# Patient Record
Sex: FEMALE | Race: WHITE | NOT HISPANIC OR LATINO | Employment: OTHER | URBAN - METROPOLITAN AREA
[De-identification: names, ages, dates, MRNs, and addresses within clinical notes are randomized per-mention and may not be internally consistent; named-entity substitution may affect disease eponyms.]

---

## 2018-01-02 ENCOUNTER — OFFICE VISIT CONVERTED (OUTPATIENT)
Dept: FAMILY MEDICINE CLINIC | Facility: CLINIC | Age: 63
End: 2018-01-02
Attending: NURSE PRACTITIONER

## 2019-01-07 ENCOUNTER — HOSPITAL ENCOUNTER (OUTPATIENT)
Dept: URGENT CARE | Facility: CLINIC | Age: 64
Discharge: HOME | End: 2019-01-07

## 2019-01-09 LAB — BACTERIA SPEC AEROBE CULT: NORMAL

## 2019-01-15 ENCOUNTER — OFFICE VISIT CONVERTED (OUTPATIENT)
Dept: FAMILY MEDICINE CLINIC | Facility: CLINIC | Age: 64
End: 2019-01-15
Attending: NURSE PRACTITIONER

## 2019-03-27 ENCOUNTER — HOSPITAL ENCOUNTER (OUTPATIENT)
Dept: FAMILY MEDICINE CLINIC | Facility: CLINIC | Age: 64
Discharge: HOME | End: 2019-03-27

## 2019-03-27 ENCOUNTER — OFFICE VISIT CONVERTED (OUTPATIENT)
Dept: FAMILY MEDICINE CLINIC | Facility: CLINIC | Age: 64
End: 2019-03-27
Attending: NURSE PRACTITIONER

## 2019-03-27 LAB
ALBUMIN SERPL-MCNC: 4 G/DL (ref 3.5–5)
ALBUMIN/GLOB SERPL: 1.1 {RATIO} (ref 1.4–2.6)
ALP SERPL-CCNC: 116 U/L (ref 43–160)
ALT SERPL-CCNC: 17 U/L (ref 10–40)
ANION GAP SERPL CALC-SCNC: 17 MMOL/L (ref 8–19)
AST SERPL-CCNC: 25 U/L (ref 15–50)
BASOPHILS # BLD AUTO: 0.04 10*3/UL (ref 0–0.2)
BASOPHILS NFR BLD AUTO: 0.4 % (ref 0–3)
BILIRUB SERPL-MCNC: 0.35 MG/DL (ref 0.2–1.3)
BUN SERPL-MCNC: 15 MG/DL (ref 5–25)
BUN/CREAT SERPL: 16 {RATIO} (ref 6–20)
CALCIUM SERPL-MCNC: 9.4 MG/DL (ref 8.7–10.4)
CHLORIDE SERPL-SCNC: 102 MMOL/L (ref 99–111)
CONV ABS IMM GRAN: 0.03 10*3/UL (ref 0–0.2)
CONV CO2: 27 MMOL/L (ref 22–32)
CONV IMMATURE GRAN: 0.3 % (ref 0–1.8)
CONV TOTAL PROTEIN: 7.7 G/DL (ref 6.3–8.2)
CREAT UR-MCNC: 0.91 MG/DL (ref 0.5–0.9)
DEPRECATED RDW RBC AUTO: 50.2 FL (ref 36.4–46.3)
EOSINOPHIL # BLD AUTO: 0.57 10*3/UL (ref 0–0.7)
EOSINOPHIL # BLD AUTO: 6.1 % (ref 0–7)
ERYTHROCYTE [DISTWIDTH] IN BLOOD BY AUTOMATED COUNT: 15.3 % (ref 11.7–14.4)
GFR SERPLBLD BASED ON 1.73 SQ M-ARVRAT: >60 ML/MIN/{1.73_M2}
GLOBULIN UR ELPH-MCNC: 3.7 G/DL (ref 2–3.5)
GLUCOSE SERPL-MCNC: 84 MG/DL (ref 65–99)
HBA1C MFR BLD: 13.5 G/DL (ref 12–16)
HCT VFR BLD AUTO: 44.9 % (ref 37–47)
LYMPHOCYTES # BLD AUTO: 3.11 10*3/UL (ref 1–5)
MCH RBC QN AUTO: 26.5 PG (ref 27–31)
MCHC RBC AUTO-ENTMCNC: 30.1 G/DL (ref 33–37)
MCV RBC AUTO: 88.2 FL (ref 81–99)
MONOCYTES # BLD AUTO: 0.55 10*3/UL (ref 0.2–1.2)
MONOCYTES NFR BLD AUTO: 5.9 % (ref 3–10)
NEUTROPHILS # BLD AUTO: 4.99 10*3/UL (ref 2–8)
NEUTROPHILS NFR BLD AUTO: 53.8 % (ref 30–85)
NRBC CBCN: 0 % (ref 0–0.7)
OSMOLALITY SERPL CALC.SUM OF ELEC: 292 MOSM/KG (ref 273–304)
PLATELET # BLD AUTO: 294 10*3/UL (ref 130–400)
PMV BLD AUTO: 11.2 FL (ref 9.4–12.3)
POTASSIUM SERPL-SCNC: 4.7 MMOL/L (ref 3.5–5.3)
RBC # BLD AUTO: 5.09 10*6/UL (ref 4.2–5.4)
SODIUM SERPL-SCNC: 141 MMOL/L (ref 135–147)
T4 FREE SERPL-MCNC: 1.2 NG/DL (ref 0.9–1.8)
TSH SERPL-ACNC: 1.68 M[IU]/L (ref 0.27–4.2)
VARIANT LYMPHS NFR BLD MANUAL: 33.5 % (ref 20–45)
WBC # BLD AUTO: 9.29 10*3/UL (ref 4.8–10.8)

## 2019-03-29 LAB — H PYLORI IGM SER-ACNC: <9 UNITS (ref 0–8.9)

## 2019-06-14 ENCOUNTER — OFFICE VISIT CONVERTED (OUTPATIENT)
Dept: FAMILY MEDICINE CLINIC | Facility: CLINIC | Age: 64
End: 2019-06-14
Attending: NURSE PRACTITIONER

## 2019-06-14 ENCOUNTER — CONVERSION ENCOUNTER (OUTPATIENT)
Dept: FAMILY MEDICINE CLINIC | Facility: CLINIC | Age: 64
End: 2019-06-14

## 2019-08-15 ENCOUNTER — OFFICE VISIT CONVERTED (OUTPATIENT)
Dept: FAMILY MEDICINE CLINIC | Facility: CLINIC | Age: 64
End: 2019-08-15
Attending: NURSE PRACTITIONER

## 2019-08-15 ENCOUNTER — CONVERSION ENCOUNTER (OUTPATIENT)
Dept: FAMILY MEDICINE CLINIC | Facility: CLINIC | Age: 64
End: 2019-08-15

## 2019-09-16 ENCOUNTER — OFFICE VISIT CONVERTED (OUTPATIENT)
Dept: FAMILY MEDICINE CLINIC | Facility: CLINIC | Age: 64
End: 2019-09-16
Attending: NURSE PRACTITIONER

## 2019-09-16 ENCOUNTER — CONVERSION ENCOUNTER (OUTPATIENT)
Dept: FAMILY MEDICINE CLINIC | Facility: CLINIC | Age: 64
End: 2019-09-16

## 2019-11-06 ENCOUNTER — HOSPITAL ENCOUNTER (OUTPATIENT)
Dept: URGENT CARE | Facility: CLINIC | Age: 64
Discharge: HOME | End: 2019-11-06
Attending: EMERGENCY MEDICINE

## 2019-12-01 ENCOUNTER — HOSPITAL ENCOUNTER (OUTPATIENT)
Dept: URGENT CARE | Facility: CLINIC | Age: 64
Discharge: HOME | End: 2019-12-01

## 2019-12-27 ENCOUNTER — HOSPITAL ENCOUNTER (OUTPATIENT)
Dept: URGENT CARE | Facility: CLINIC | Age: 64
Discharge: HOME | End: 2019-12-27
Attending: FAMILY MEDICINE

## 2019-12-27 LAB
ALBUMIN SERPL-MCNC: 3.7 G/DL (ref 3.5–5)
ALBUMIN/GLOB SERPL: 1.2 {RATIO} (ref 1.4–2.6)
ALP SERPL-CCNC: 93 U/L (ref 43–160)
ALT SERPL-CCNC: 21 U/L (ref 10–40)
ANION GAP SERPL CALC-SCNC: 17 MMOL/L (ref 8–19)
AST SERPL-CCNC: 17 U/L (ref 15–50)
BILIRUB SERPL-MCNC: 0.32 MG/DL (ref 0.2–1.3)
BNP SERPL-MCNC: 88 PG/ML (ref 0–900)
BUN SERPL-MCNC: 23 MG/DL (ref 5–25)
BUN/CREAT SERPL: 22 {RATIO} (ref 6–20)
CALCIUM SERPL-MCNC: 9.7 MG/DL (ref 8.7–10.4)
CHLORIDE SERPL-SCNC: 99 MMOL/L (ref 99–111)
CONV CO2: 26 MMOL/L (ref 22–32)
CONV TOTAL PROTEIN: 6.8 G/DL (ref 6.3–8.2)
CREAT UR-MCNC: 1.06 MG/DL (ref 0.5–0.9)
GFR SERPLBLD BASED ON 1.73 SQ M-ARVRAT: 56 ML/MIN/{1.73_M2}
GLOBULIN UR ELPH-MCNC: 3.1 G/DL (ref 2–3.5)
GLUCOSE SERPL-MCNC: 152 MG/DL (ref 65–99)
OSMOLALITY SERPL CALC.SUM OF ELEC: 291 MOSM/KG (ref 273–304)
POTASSIUM SERPL-SCNC: 4.8 MMOL/L (ref 3.5–5.3)
SODIUM SERPL-SCNC: 137 MMOL/L (ref 135–147)
TSH SERPL-ACNC: 0.53 M[IU]/L (ref 0.27–4.2)

## 2020-01-02 ENCOUNTER — CONVERSION ENCOUNTER (OUTPATIENT)
Dept: FAMILY MEDICINE CLINIC | Facility: CLINIC | Age: 65
End: 2020-01-02

## 2020-01-02 ENCOUNTER — OFFICE VISIT CONVERTED (OUTPATIENT)
Dept: FAMILY MEDICINE CLINIC | Facility: CLINIC | Age: 65
End: 2020-01-02
Attending: NURSE PRACTITIONER

## 2020-01-03 ENCOUNTER — HOSPITAL ENCOUNTER (OUTPATIENT)
Dept: CARDIOLOGY | Facility: HOSPITAL | Age: 65
Discharge: HOME | End: 2020-01-03
Attending: NURSE PRACTITIONER

## 2020-01-08 ENCOUNTER — HOSPITAL ENCOUNTER (OUTPATIENT)
Dept: OTHER | Facility: HOSPITAL | Age: 65
Discharge: HOME | End: 2020-01-08

## 2020-01-08 LAB
ALBUMIN SERPL-MCNC: 3.8 G/DL (ref 3.5–5)
ALBUMIN/GLOB SERPL: 1.2 {RATIO} (ref 1.4–2.6)
ALP SERPL-CCNC: 95 U/L (ref 43–160)
ALT SERPL-CCNC: 20 U/L (ref 10–40)
ANION GAP SERPL CALC-SCNC: 16 MMOL/L (ref 8–19)
AST SERPL-CCNC: 16 U/L (ref 15–50)
BASOPHILS # BLD AUTO: 0.05 10*3/UL (ref 0–0.2)
BASOPHILS NFR BLD AUTO: 0.4 % (ref 0–3)
BILIRUB SERPL-MCNC: 0.3 MG/DL (ref 0.2–1.3)
BUN SERPL-MCNC: 17 MG/DL (ref 5–25)
BUN/CREAT SERPL: 17 {RATIO} (ref 6–20)
CALCIUM SERPL-MCNC: 9 MG/DL (ref 8.7–10.4)
CHLORIDE SERPL-SCNC: 100 MMOL/L (ref 99–111)
CONV ABS IMM GRAN: 0.14 10*3/UL (ref 0–0.2)
CONV CO2: 28 MMOL/L (ref 22–32)
CONV IMMATURE GRAN: 1.1 % (ref 0–1.8)
CONV TOTAL PROTEIN: 6.9 G/DL (ref 6.3–8.2)
CREAT UR-MCNC: 1.02 MG/DL (ref 0.5–0.9)
DEPRECATED RDW RBC AUTO: 53.1 FL (ref 36.4–46.3)
EOSINOPHIL # BLD AUTO: 0.14 10*3/UL (ref 0–0.7)
EOSINOPHIL # BLD AUTO: 1.1 % (ref 0–7)
ERYTHROCYTE [DISTWIDTH] IN BLOOD BY AUTOMATED COUNT: 15.8 % (ref 11.7–14.4)
ERYTHROCYTE [SEDIMENTATION RATE] IN BLOOD: 5 MM/H (ref 0–30)
GFR SERPLBLD BASED ON 1.73 SQ M-ARVRAT: 59 ML/MIN/{1.73_M2}
GLOBULIN UR ELPH-MCNC: 3.1 G/DL (ref 2–3.5)
GLUCOSE SERPL-MCNC: 109 MG/DL (ref 65–99)
HCT VFR BLD AUTO: 45.5 % (ref 37–47)
HGB BLD-MCNC: 13.9 G/DL (ref 12–16)
LYMPHOCYTES # BLD AUTO: 2.66 10*3/UL (ref 1–5)
LYMPHOCYTES NFR BLD AUTO: 20.1 % (ref 20–45)
MCH RBC QN AUTO: 28 PG (ref 27–31)
MCHC RBC AUTO-ENTMCNC: 30.5 G/DL (ref 33–37)
MCV RBC AUTO: 91.5 FL (ref 81–99)
MONOCYTES # BLD AUTO: 0.36 10*3/UL (ref 0.2–1.2)
MONOCYTES NFR BLD AUTO: 2.7 % (ref 3–10)
NEUTROPHILS # BLD AUTO: 9.87 10*3/UL (ref 2–8)
NEUTROPHILS NFR BLD AUTO: 74.6 % (ref 30–85)
NRBC CBCN: 0 % (ref 0–0.7)
OSMOLALITY SERPL CALC.SUM OF ELEC: 292 MOSM/KG (ref 273–304)
PLATELET # BLD AUTO: 250 10*3/UL (ref 130–400)
PMV BLD AUTO: 10 FL (ref 9.4–12.3)
POTASSIUM SERPL-SCNC: 4.3 MMOL/L (ref 3.5–5.3)
RBC # BLD AUTO: 4.97 10*6/UL (ref 4.2–5.4)
SODIUM SERPL-SCNC: 140 MMOL/L (ref 135–147)
WBC # BLD AUTO: 13.22 10*3/UL (ref 4.8–10.8)

## 2020-01-09 LAB
DSDNA AB SER-ACNC: NEGATIVE [IU]/ML
ENA AB SER IA-ACNC: NEGATIVE {RATIO}

## 2020-01-14 LAB
TPMT ACTIVITY: 26.1 UNITS/ML RBC
TPMT INTERPRETATION: NORMAL
TPMT METHODOLOGY: NORMAL

## 2020-02-27 ENCOUNTER — HOSPITAL ENCOUNTER (OUTPATIENT)
Dept: OTHER | Facility: HOSPITAL | Age: 65
Discharge: HOME | End: 2020-02-27

## 2020-02-27 LAB
ALBUMIN SERPL-MCNC: 3.5 G/DL (ref 3.5–5)
ALBUMIN/GLOB SERPL: 1.2 {RATIO} (ref 1.4–2.6)
ALP SERPL-CCNC: 100 U/L (ref 43–160)
ALT SERPL-CCNC: 23 U/L (ref 10–40)
ANION GAP SERPL CALC-SCNC: 18 MMOL/L (ref 8–19)
AST SERPL-CCNC: 15 U/L (ref 15–50)
BASOPHILS # BLD AUTO: 0.06 10*3/UL (ref 0–0.2)
BASOPHILS NFR BLD AUTO: 0.6 % (ref 0–3)
BILIRUB SERPL-MCNC: 0.36 MG/DL (ref 0.2–1.3)
BUN SERPL-MCNC: 16 MG/DL (ref 5–25)
BUN/CREAT SERPL: 15 {RATIO} (ref 6–20)
CALCIUM SERPL-MCNC: 10.1 MG/DL (ref 8.7–10.4)
CHLORIDE SERPL-SCNC: 98 MMOL/L (ref 99–111)
CONV ABS IMM GRAN: 0.05 10*3/UL (ref 0–0.2)
CONV CO2: 28 MMOL/L (ref 22–32)
CONV IMMATURE GRAN: 0.5 % (ref 0–1.8)
CONV TOTAL PROTEIN: 6.5 G/DL (ref 6.3–8.2)
CREAT UR-MCNC: 1.06 MG/DL (ref 0.5–0.9)
DEPRECATED RDW RBC AUTO: 49.2 FL (ref 36.4–46.3)
EOSINOPHIL # BLD AUTO: 0.34 10*3/UL (ref 0–0.7)
EOSINOPHIL # BLD AUTO: 3.3 % (ref 0–7)
ERYTHROCYTE [DISTWIDTH] IN BLOOD BY AUTOMATED COUNT: 14.7 % (ref 11.7–14.4)
GFR SERPLBLD BASED ON 1.73 SQ M-ARVRAT: 56 ML/MIN/{1.73_M2}
GLOBULIN UR ELPH-MCNC: 3 G/DL (ref 2–3.5)
GLUCOSE SERPL-MCNC: 107 MG/DL (ref 65–99)
HCT VFR BLD AUTO: 43.7 % (ref 37–47)
HGB BLD-MCNC: 13.3 G/DL (ref 12–16)
LYMPHOCYTES # BLD AUTO: 3.63 10*3/UL (ref 1–5)
LYMPHOCYTES NFR BLD AUTO: 35 % (ref 20–45)
MCH RBC QN AUTO: 27.7 PG (ref 27–31)
MCHC RBC AUTO-ENTMCNC: 30.4 G/DL (ref 33–37)
MCV RBC AUTO: 91 FL (ref 81–99)
MONOCYTES # BLD AUTO: 0.78 10*3/UL (ref 0.2–1.2)
MONOCYTES NFR BLD AUTO: 7.5 % (ref 3–10)
NEUTROPHILS # BLD AUTO: 5.5 10*3/UL (ref 2–8)
NEUTROPHILS NFR BLD AUTO: 53.1 % (ref 30–85)
NRBC CBCN: 0 % (ref 0–0.7)
OSMOLALITY SERPL CALC.SUM OF ELEC: 292 MOSM/KG (ref 273–304)
PLATELET # BLD AUTO: 260 10*3/UL (ref 130–400)
PMV BLD AUTO: 9.6 FL (ref 9.4–12.3)
POTASSIUM SERPL-SCNC: 3.8 MMOL/L (ref 3.5–5.3)
RBC # BLD AUTO: 4.8 10*6/UL (ref 4.2–5.4)
SODIUM SERPL-SCNC: 140 MMOL/L (ref 135–147)
WBC # BLD AUTO: 10.36 10*3/UL (ref 4.8–10.8)

## 2020-03-13 ENCOUNTER — CONVERSION ENCOUNTER (OUTPATIENT)
Dept: FAMILY MEDICINE CLINIC | Facility: CLINIC | Age: 65
End: 2020-03-13

## 2020-03-13 ENCOUNTER — HOSPITAL ENCOUNTER (OUTPATIENT)
Dept: FAMILY MEDICINE CLINIC | Facility: CLINIC | Age: 65
Discharge: HOME | End: 2020-03-13
Attending: NURSE PRACTITIONER

## 2020-03-13 ENCOUNTER — OFFICE VISIT CONVERTED (OUTPATIENT)
Dept: FAMILY MEDICINE CLINIC | Facility: CLINIC | Age: 65
End: 2020-03-13
Attending: NURSE PRACTITIONER

## 2020-03-13 LAB
25(OH)D3 SERPL-MCNC: 19.2 NG/ML (ref 30–100)
ALBUMIN SERPL-MCNC: 3.6 G/DL (ref 3.5–5)
ALBUMIN/GLOB SERPL: 1.1 {RATIO} (ref 1.4–2.6)
ALP SERPL-CCNC: 104 U/L (ref 43–160)
ALT SERPL-CCNC: 20 U/L (ref 10–40)
ANION GAP SERPL CALC-SCNC: 15 MMOL/L (ref 8–19)
AST SERPL-CCNC: 20 U/L (ref 15–50)
BASOPHILS # BLD AUTO: 0.04 10*3/UL (ref 0–0.2)
BASOPHILS NFR BLD AUTO: 0.3 % (ref 0–3)
BILIRUB SERPL-MCNC: 0.35 MG/DL (ref 0.2–1.3)
BUN SERPL-MCNC: 14 MG/DL (ref 5–25)
BUN/CREAT SERPL: 14 {RATIO} (ref 6–20)
CALCIUM SERPL-MCNC: 10.1 MG/DL (ref 8.7–10.4)
CHLORIDE SERPL-SCNC: 101 MMOL/L (ref 99–111)
CHOLEST SERPL-MCNC: 199 MG/DL (ref 107–200)
CHOLEST/HDLC SERPL: 5.4 {RATIO} (ref 3–6)
CONV ABS IMM GRAN: 0.07 10*3/UL (ref 0–0.2)
CONV CO2: 28 MMOL/L (ref 22–32)
CONV IMMATURE GRAN: 0.6 % (ref 0–1.8)
CONV TOTAL PROTEIN: 6.8 G/DL (ref 6.3–8.2)
CREAT UR-MCNC: 1 MG/DL (ref 0.5–0.9)
DEPRECATED RDW RBC AUTO: 50.3 FL (ref 36.4–46.3)
EOSINOPHIL # BLD AUTO: 0.54 10*3/UL (ref 0–0.7)
EOSINOPHIL # BLD AUTO: 4.6 % (ref 0–7)
ERYTHROCYTE [DISTWIDTH] IN BLOOD BY AUTOMATED COUNT: 14.7 % (ref 11.7–14.4)
FERRITIN SERPL-MCNC: 113 NG/ML (ref 10–200)
FOLATE SERPL-MCNC: 10.3 NG/ML (ref 4.8–20)
GFR SERPLBLD BASED ON 1.73 SQ M-ARVRAT: >60 ML/MIN/{1.73_M2}
GLOBULIN UR ELPH-MCNC: 3.2 G/DL (ref 2–3.5)
GLUCOSE SERPL-MCNC: 114 MG/DL (ref 65–99)
HCT VFR BLD AUTO: 43.8 % (ref 37–47)
HDLC SERPL-MCNC: 37 MG/DL (ref 40–60)
HGB BLD-MCNC: 13.1 G/DL (ref 12–16)
IRON SATN MFR SERPL: 15 % (ref 20–55)
IRON SERPL-MCNC: 50 UG/DL (ref 60–170)
LDLC SERPL CALC-MCNC: 121 MG/DL (ref 70–100)
LYMPHOCYTES # BLD AUTO: 2.43 10*3/UL (ref 1–5)
LYMPHOCYTES NFR BLD AUTO: 20.9 % (ref 20–45)
MCH RBC QN AUTO: 27.7 PG (ref 27–31)
MCHC RBC AUTO-ENTMCNC: 29.9 G/DL (ref 33–37)
MCV RBC AUTO: 92.6 FL (ref 81–99)
MONOCYTES # BLD AUTO: 0.78 10*3/UL (ref 0.2–1.2)
MONOCYTES NFR BLD AUTO: 6.7 % (ref 3–10)
NEUTROPHILS # BLD AUTO: 7.78 10*3/UL (ref 2–8)
NEUTROPHILS NFR BLD AUTO: 66.9 % (ref 30–85)
NRBC CBCN: 0 % (ref 0–0.7)
OSMOLALITY SERPL CALC.SUM OF ELEC: 291 MOSM/KG (ref 273–304)
PLATELET # BLD AUTO: 322 10*3/UL (ref 130–400)
PMV BLD AUTO: 10.2 FL (ref 9.4–12.3)
POTASSIUM SERPL-SCNC: 4.4 MMOL/L (ref 3.5–5.3)
RBC # BLD AUTO: 4.73 10*6/UL (ref 4.2–5.4)
SODIUM SERPL-SCNC: 140 MMOL/L (ref 135–147)
TIBC SERPL-MCNC: 333 UG/DL (ref 245–450)
TRANSFERRIN SERPL-MCNC: 233 MG/DL (ref 250–380)
TRIGL SERPL-MCNC: 207 MG/DL (ref 40–150)
TSH SERPL-ACNC: 1.95 M[IU]/L (ref 0.27–4.2)
VIT B12 SERPL-MCNC: 432 PG/ML (ref 211–911)
VLDLC SERPL-MCNC: 41 MG/DL (ref 5–37)
WBC # BLD AUTO: 11.64 10*3/UL (ref 4.8–10.8)

## 2020-03-19 ENCOUNTER — HOSPITAL ENCOUNTER (OUTPATIENT)
Dept: CARDIOLOGY | Facility: HOSPITAL | Age: 65
Discharge: HOME | End: 2020-03-19
Attending: NURSE PRACTITIONER

## 2020-05-04 ENCOUNTER — HOSPITAL ENCOUNTER (OUTPATIENT)
Dept: OTHER | Facility: HOSPITAL | Age: 65
Discharge: HOME | End: 2020-05-04

## 2020-05-04 LAB
ALBUMIN SERPL-MCNC: 3.5 G/DL (ref 3.5–5)
ALBUMIN/GLOB SERPL: 1.2 {RATIO} (ref 1.4–2.6)
ALP SERPL-CCNC: 93 U/L (ref 43–160)
ALT SERPL-CCNC: 13 U/L (ref 10–40)
ANION GAP SERPL CALC-SCNC: 22 MMOL/L (ref 8–19)
AST SERPL-CCNC: 16 U/L (ref 15–50)
BASOPHILS # BLD AUTO: 0.04 10*3/UL (ref 0–0.2)
BASOPHILS NFR BLD AUTO: 0.5 % (ref 0–3)
BILIRUB SERPL-MCNC: 0.38 MG/DL (ref 0.2–1.3)
BUN SERPL-MCNC: 11 MG/DL (ref 5–25)
BUN/CREAT SERPL: 10 {RATIO} (ref 6–20)
CALCIUM SERPL-MCNC: 9.7 MG/DL (ref 8.7–10.4)
CHLORIDE SERPL-SCNC: 104 MMOL/L (ref 99–111)
CONV ABS IMM GRAN: 0.05 10*3/UL (ref 0–0.2)
CONV CO2: 23 MMOL/L (ref 22–32)
CONV IMMATURE GRAN: 0.6 % (ref 0–1.8)
CONV TOTAL PROTEIN: 6.5 G/DL (ref 6.3–8.2)
CREAT UR-MCNC: 1.12 MG/DL (ref 0.5–0.9)
DEPRECATED RDW RBC AUTO: 47.8 FL (ref 36.4–46.3)
EOSINOPHIL # BLD AUTO: 1.18 10*3/UL (ref 0–0.7)
EOSINOPHIL # BLD AUTO: 13.4 % (ref 0–7)
ERYTHROCYTE [DISTWIDTH] IN BLOOD BY AUTOMATED COUNT: 14.6 % (ref 11.7–14.4)
GFR SERPLBLD BASED ON 1.73 SQ M-ARVRAT: 52 ML/MIN/{1.73_M2}
GLOBULIN UR ELPH-MCNC: 3 G/DL (ref 2–3.5)
GLUCOSE SERPL-MCNC: 104 MG/DL (ref 65–99)
HCT VFR BLD AUTO: 40.8 % (ref 37–47)
HGB BLD-MCNC: 12 G/DL (ref 12–16)
LYMPHOCYTES # BLD AUTO: 2.74 10*3/UL (ref 1–5)
LYMPHOCYTES NFR BLD AUTO: 31.2 % (ref 20–45)
MCH RBC QN AUTO: 26.6 PG (ref 27–31)
MCHC RBC AUTO-ENTMCNC: 29.4 G/DL (ref 33–37)
MCV RBC AUTO: 90.5 FL (ref 81–99)
MONOCYTES # BLD AUTO: 0.53 10*3/UL (ref 0.2–1.2)
MONOCYTES NFR BLD AUTO: 6 % (ref 3–10)
NEUTROPHILS # BLD AUTO: 4.24 10*3/UL (ref 2–8)
NEUTROPHILS NFR BLD AUTO: 48.3 % (ref 30–85)
NRBC CBCN: 0 % (ref 0–0.7)
OSMOLALITY SERPL CALC.SUM OF ELEC: 300 MOSM/KG (ref 273–304)
PLATELET # BLD AUTO: 340 10*3/UL (ref 130–400)
PMV BLD AUTO: 10.3 FL (ref 9.4–12.3)
POTASSIUM SERPL-SCNC: 3.8 MMOL/L (ref 3.5–5.3)
RBC # BLD AUTO: 4.51 10*6/UL (ref 4.2–5.4)
SODIUM SERPL-SCNC: 145 MMOL/L (ref 135–147)
WBC # BLD AUTO: 8.78 10*3/UL (ref 4.8–10.8)

## 2020-05-28 ENCOUNTER — OFFICE VISIT CONVERTED (OUTPATIENT)
Dept: FAMILY MEDICINE CLINIC | Facility: CLINIC | Age: 65
End: 2020-05-28
Attending: NURSE PRACTITIONER

## 2020-05-28 ENCOUNTER — CONVERSION ENCOUNTER (OUTPATIENT)
Dept: FAMILY MEDICINE CLINIC | Facility: CLINIC | Age: 65
End: 2020-05-28

## 2020-06-01 ENCOUNTER — OFFICE VISIT CONVERTED (OUTPATIENT)
Dept: FAMILY MEDICINE CLINIC | Facility: CLINIC | Age: 65
End: 2020-06-01
Attending: FAMILY MEDICINE

## 2020-07-29 ENCOUNTER — HOSPITAL ENCOUNTER (OUTPATIENT)
Dept: OTHER | Facility: HOSPITAL | Age: 65
Discharge: HOME | End: 2020-07-29

## 2020-07-29 LAB
ALBUMIN SERPL-MCNC: 3.8 G/DL (ref 3.5–5)
ALBUMIN/GLOB SERPL: 1.4 {RATIO} (ref 1.4–2.6)
ALP SERPL-CCNC: 92 U/L (ref 43–160)
ALT SERPL-CCNC: 28 U/L (ref 10–40)
ANION GAP SERPL CALC-SCNC: 20 MMOL/L (ref 8–19)
AST SERPL-CCNC: 26 U/L (ref 15–50)
BASOPHILS # BLD AUTO: 0.05 10*3/UL (ref 0–0.2)
BASOPHILS NFR BLD AUTO: 0.4 % (ref 0–3)
BILIRUB SERPL-MCNC: 0.33 MG/DL (ref 0.2–1.3)
BUN SERPL-MCNC: 13 MG/DL (ref 5–25)
BUN/CREAT SERPL: 12 {RATIO} (ref 6–20)
CALCIUM SERPL-MCNC: 12.1 MG/DL (ref 8.7–10.4)
CHLORIDE SERPL-SCNC: 94 MMOL/L (ref 99–111)
CONV ABS IMM GRAN: 0.15 10*3/UL (ref 0–0.2)
CONV CO2: 31 MMOL/L (ref 22–32)
CONV IMMATURE GRAN: 1.2 % (ref 0–1.8)
CONV TOTAL PROTEIN: 6.5 G/DL (ref 6.3–8.2)
CREAT UR-MCNC: 1.13 MG/DL (ref 0.5–0.9)
DEPRECATED RDW RBC AUTO: 58.9 FL (ref 36.4–46.3)
EOSINOPHIL # BLD AUTO: 0.18 10*3/UL (ref 0–0.7)
EOSINOPHIL # BLD AUTO: 1.4 % (ref 0–7)
ERYTHROCYTE [DISTWIDTH] IN BLOOD BY AUTOMATED COUNT: 17.2 % (ref 11.7–14.4)
GFR SERPLBLD BASED ON 1.73 SQ M-ARVRAT: 52 ML/MIN/{1.73_M2}
GLOBULIN UR ELPH-MCNC: 2.7 G/DL (ref 2–3.5)
GLUCOSE SERPL-MCNC: 143 MG/DL (ref 65–99)
HCT VFR BLD AUTO: 44.6 % (ref 37–47)
HGB BLD-MCNC: 13.1 G/DL (ref 12–16)
LYMPHOCYTES # BLD AUTO: 2.74 10*3/UL (ref 1–5)
LYMPHOCYTES NFR BLD AUTO: 21.5 % (ref 20–45)
MCH RBC QN AUTO: 27.9 PG (ref 27–31)
MCHC RBC AUTO-ENTMCNC: 29.4 G/DL (ref 33–37)
MCV RBC AUTO: 95.1 FL (ref 81–99)
MONOCYTES # BLD AUTO: 0.72 10*3/UL (ref 0.2–1.2)
MONOCYTES NFR BLD AUTO: 5.7 % (ref 3–10)
NEUTROPHILS # BLD AUTO: 8.88 10*3/UL (ref 2–8)
NEUTROPHILS NFR BLD AUTO: 69.8 % (ref 30–85)
NRBC CBCN: 0 % (ref 0–0.7)
OSMOLALITY SERPL CALC.SUM OF ELEC: 295 MOSM/KG (ref 273–304)
PLATELET # BLD AUTO: 274 10*3/UL (ref 130–400)
PMV BLD AUTO: 11.4 FL (ref 9.4–12.3)
POTASSIUM SERPL-SCNC: 4 MMOL/L (ref 3.5–5.3)
RBC # BLD AUTO: 4.69 10*6/UL (ref 4.2–5.4)
SODIUM SERPL-SCNC: 141 MMOL/L (ref 135–147)
WBC # BLD AUTO: 12.72 10*3/UL (ref 4.8–10.8)

## 2020-07-31 ENCOUNTER — HOSPITAL ENCOUNTER (OUTPATIENT)
Dept: OTHER | Facility: HOSPITAL | Age: 65
Discharge: HOME | End: 2020-07-31

## 2020-07-31 LAB
ALBUMIN SERPL-MCNC: 3.6 G/DL (ref 3.5–5)
ALBUMIN/GLOB SERPL: 1.3 {RATIO} (ref 1.4–2.6)
ALP SERPL-CCNC: 88 U/L (ref 43–160)
ALT SERPL-CCNC: 26 U/L (ref 10–40)
ANION GAP SERPL CALC-SCNC: 21 MMOL/L (ref 8–19)
AST SERPL-CCNC: 19 U/L (ref 15–50)
BILIRUB SERPL-MCNC: 0.37 MG/DL (ref 0.2–1.3)
BUN SERPL-MCNC: 19 MG/DL (ref 5–25)
BUN/CREAT SERPL: 14 {RATIO} (ref 6–20)
CALCIUM SERPL-MCNC: 12.1 MG/DL (ref 8.7–10.4)
CHLORIDE SERPL-SCNC: 97 MMOL/L (ref 99–111)
CONV CO2: 28 MMOL/L (ref 22–32)
CONV TOTAL PROTEIN: 6.3 G/DL (ref 6.3–8.2)
CREAT UR-MCNC: 1.35 MG/DL (ref 0.5–0.9)
GFR SERPLBLD BASED ON 1.73 SQ M-ARVRAT: 42 ML/MIN/{1.73_M2}
GLOBULIN UR ELPH-MCNC: 2.7 G/DL (ref 2–3.5)
GLUCOSE SERPL-MCNC: 156 MG/DL (ref 65–99)
OSMOLALITY SERPL CALC.SUM OF ELEC: 301 MOSM/KG (ref 273–304)
POTASSIUM SERPL-SCNC: 3.4 MMOL/L (ref 3.5–5.3)
SODIUM SERPL-SCNC: 143 MMOL/L (ref 135–147)

## 2020-08-04 ENCOUNTER — HOSPITAL ENCOUNTER (OUTPATIENT)
Dept: FAMILY MEDICINE CLINIC | Facility: CLINIC | Age: 65
Discharge: HOME | End: 2020-08-04
Attending: NURSE PRACTITIONER

## 2020-08-04 ENCOUNTER — CONVERSION ENCOUNTER (OUTPATIENT)
Dept: FAMILY MEDICINE CLINIC | Facility: CLINIC | Age: 65
End: 2020-08-04

## 2020-08-04 ENCOUNTER — OFFICE VISIT CONVERTED (OUTPATIENT)
Dept: FAMILY MEDICINE CLINIC | Facility: CLINIC | Age: 65
End: 2020-08-04
Attending: NURSE PRACTITIONER

## 2020-08-04 LAB
BASOPHILS # BLD AUTO: 0.05 10*3/UL (ref 0–0.2)
BASOPHILS NFR BLD AUTO: 0.4 % (ref 0–3)
CONV ABS IMM GRAN: 0.15 10*3/UL (ref 0–0.2)
CONV IMMATURE GRAN: 1.3 % (ref 0–1.8)
DEPRECATED RDW RBC AUTO: 57.2 FL (ref 36.4–46.3)
EOSINOPHIL # BLD AUTO: 0.32 10*3/UL (ref 0–0.7)
EOSINOPHIL # BLD AUTO: 2.7 % (ref 0–7)
ERYTHROCYTE [DISTWIDTH] IN BLOOD BY AUTOMATED COUNT: 16.5 % (ref 11.7–14.4)
HCT VFR BLD AUTO: 42.3 % (ref 37–47)
HGB BLD-MCNC: 12.7 G/DL (ref 12–16)
LYMPHOCYTES # BLD AUTO: 2.22 10*3/UL (ref 1–5)
LYMPHOCYTES NFR BLD AUTO: 19.1 % (ref 20–45)
MCH RBC QN AUTO: 28.3 PG (ref 27–31)
MCHC RBC AUTO-ENTMCNC: 30 G/DL (ref 33–37)
MCV RBC AUTO: 94.4 FL (ref 81–99)
MONOCYTES # BLD AUTO: 0.63 10*3/UL (ref 0.2–1.2)
MONOCYTES NFR BLD AUTO: 5.4 % (ref 3–10)
NEUTROPHILS # BLD AUTO: 8.28 10*3/UL (ref 2–8)
NEUTROPHILS NFR BLD AUTO: 71.1 % (ref 30–85)
NRBC CBCN: 0 % (ref 0–0.7)
PLATELET # BLD AUTO: 313 10*3/UL (ref 130–400)
PMV BLD AUTO: 10.9 FL (ref 9.4–12.3)
RBC # BLD AUTO: 4.48 10*6/UL (ref 4.2–5.4)
WBC # BLD AUTO: 11.65 10*3/UL (ref 4.8–10.8)

## 2020-08-05 LAB
ALBUMIN SERPL-MCNC: 3.9 G/DL (ref 3.5–5)
ALBUMIN/GLOB SERPL: 1.6 {RATIO} (ref 1.4–2.6)
ALP SERPL-CCNC: 89 U/L (ref 43–160)
ALT SERPL-CCNC: 28 U/L (ref 10–40)
ANION GAP SERPL CALC-SCNC: 20 MMOL/L (ref 8–19)
AST SERPL-CCNC: 26 U/L (ref 15–50)
BILIRUB SERPL-MCNC: 0.23 MG/DL (ref 0.2–1.3)
BUN SERPL-MCNC: 9 MG/DL (ref 5–25)
BUN/CREAT SERPL: 9 {RATIO} (ref 6–20)
CALCIUM SERPL-MCNC: 10.2 MG/DL (ref 8.7–10.4)
CHLORIDE SERPL-SCNC: 99 MMOL/L (ref 99–111)
CONV CO2: 25 MMOL/L (ref 22–32)
CONV TOTAL PROTEIN: 6.4 G/DL (ref 6.3–8.2)
CREAT UR-MCNC: 1.03 MG/DL (ref 0.5–0.9)
FERRITIN SERPL-MCNC: 145 NG/ML (ref 10–200)
FOLATE SERPL-MCNC: >20 NG/ML (ref 4.8–20)
GFR SERPLBLD BASED ON 1.73 SQ M-ARVRAT: 58 ML/MIN/{1.73_M2}
GLOBULIN UR ELPH-MCNC: 2.5 G/DL (ref 2–3.5)
GLUCOSE SERPL-MCNC: 124 MG/DL (ref 65–99)
IRON SATN MFR SERPL: 9 % (ref 20–55)
IRON SERPL-MCNC: 34 UG/DL (ref 60–170)
OSMOLALITY SERPL CALC.SUM OF ELEC: 290 MOSM/KG (ref 273–304)
POTASSIUM SERPL-SCNC: 4.4 MMOL/L (ref 3.5–5.3)
SODIUM SERPL-SCNC: 140 MMOL/L (ref 135–147)
TIBC SERPL-MCNC: 369 UG/DL (ref 245–450)
TRANSFERRIN SERPL-MCNC: 258 MG/DL (ref 250–380)
VIT B12 SERPL-MCNC: 450 PG/ML (ref 211–911)

## 2020-08-06 LAB
EST. AVERAGE GLUCOSE BLD GHB EST-MCNC: 137 MG/DL
HBA1C MFR BLD: 6.4 % (ref 3.5–5.7)

## 2020-08-07 LAB
BACTERIA SPEC AEROBE CULT: ABNORMAL
CIPROFLOXACIN SUSC ISLT: <=0.5
CLINDAMYCIN SUSC ISLT: 0.25
DAPTOMYCIN SUSC ISLT: 0.25
DOXYCYCLINE SUSC ISLT: <=0.5
ERYTHROMYCIN SUSC ISLT: <=0.25
GENTAMICIN SUSC ISLT: <=0.5
LEVOFLOXACIN SUSC ISLT: <=0.12
OXACILLIN SUSC ISLT: <=0.25
RIFAMPIN SUSC ISLT: <=0.5
TETRACYCLINE SUSC ISLT: <=1
TIGECYCLINE SUSC ISLT: <=0.12
TMP SMX SUSC ISLT: <=10
VANCOMYCIN SUSC ISLT: <=0.5

## 2020-08-31 ENCOUNTER — HOSPITAL ENCOUNTER (OUTPATIENT)
Dept: OTHER | Facility: HOSPITAL | Age: 65
Discharge: HOME | End: 2020-08-31

## 2020-08-31 LAB
ALBUMIN SERPL-MCNC: 3.6 G/DL (ref 3.5–5)
ALBUMIN/GLOB SERPL: 1.3 {RATIO} (ref 1.4–2.6)
ALP SERPL-CCNC: 88 U/L (ref 43–160)
ALT SERPL-CCNC: 23 U/L (ref 10–40)
ANION GAP SERPL CALC-SCNC: 17 MMOL/L (ref 8–19)
AST SERPL-CCNC: 24 U/L (ref 15–50)
BASOPHILS # BLD AUTO: 0.05 10*3/UL (ref 0–0.2)
BASOPHILS NFR BLD AUTO: 0.4 % (ref 0–3)
BILIRUB SERPL-MCNC: 0.26 MG/DL (ref 0.2–1.3)
BUN SERPL-MCNC: 12 MG/DL (ref 5–25)
BUN/CREAT SERPL: 11 {RATIO} (ref 6–20)
CALCIUM SERPL-MCNC: 10.4 MG/DL (ref 8.7–10.4)
CHLORIDE SERPL-SCNC: 101 MMOL/L (ref 99–111)
CONV ABS IMM GRAN: 0.08 10*3/UL (ref 0–0.2)
CONV CO2: 28 MMOL/L (ref 22–32)
CONV IMMATURE GRAN: 0.7 % (ref 0–1.8)
CONV TOTAL PROTEIN: 6.3 G/DL (ref 6.3–8.2)
CREAT UR-MCNC: 1.12 MG/DL (ref 0.5–0.9)
DEPRECATED RDW RBC AUTO: 55.3 FL (ref 36.4–46.3)
EOSINOPHIL # BLD AUTO: 0.4 10*3/UL (ref 0–0.7)
EOSINOPHIL # BLD AUTO: 3.5 % (ref 0–7)
ERYTHROCYTE [DISTWIDTH] IN BLOOD BY AUTOMATED COUNT: 15.4 % (ref 11.7–14.4)
GFR SERPLBLD BASED ON 1.73 SQ M-ARVRAT: 52 ML/MIN/{1.73_M2}
GLOBULIN UR ELPH-MCNC: 2.7 G/DL (ref 2–3.5)
GLUCOSE SERPL-MCNC: 148 MG/DL (ref 65–99)
HCT VFR BLD AUTO: 42.5 % (ref 37–47)
HGB BLD-MCNC: 12.4 G/DL (ref 12–16)
LYMPHOCYTES # BLD AUTO: 3.56 10*3/UL (ref 1–5)
LYMPHOCYTES NFR BLD AUTO: 30.7 % (ref 20–45)
MCH RBC QN AUTO: 28.6 PG (ref 27–31)
MCHC RBC AUTO-ENTMCNC: 29.2 G/DL (ref 33–37)
MCV RBC AUTO: 97.9 FL (ref 81–99)
MONOCYTES # BLD AUTO: 0.82 10*3/UL (ref 0.2–1.2)
MONOCYTES NFR BLD AUTO: 7.1 % (ref 3–10)
NEUTROPHILS # BLD AUTO: 6.68 10*3/UL (ref 2–8)
NEUTROPHILS NFR BLD AUTO: 57.6 % (ref 30–85)
NRBC CBCN: 0 % (ref 0–0.7)
OSMOLALITY SERPL CALC.SUM OF ELEC: 297 MOSM/KG (ref 273–304)
PLATELET # BLD AUTO: 340 10*3/UL (ref 130–400)
PMV BLD AUTO: 10.3 FL (ref 9.4–12.3)
POTASSIUM SERPL-SCNC: 4.1 MMOL/L (ref 3.5–5.3)
RBC # BLD AUTO: 4.34 10*6/UL (ref 4.2–5.4)
SODIUM SERPL-SCNC: 142 MMOL/L (ref 135–147)
WBC # BLD AUTO: 11.59 10*3/UL (ref 4.8–10.8)

## 2020-09-17 ENCOUNTER — HOSPITAL ENCOUNTER (OUTPATIENT)
Dept: FAMILY MEDICINE CLINIC | Facility: CLINIC | Age: 65
Discharge: HOME | End: 2020-09-17
Attending: NURSE PRACTITIONER

## 2020-09-17 ENCOUNTER — OFFICE VISIT CONVERTED (OUTPATIENT)
Dept: FAMILY MEDICINE CLINIC | Facility: CLINIC | Age: 65
End: 2020-09-17
Attending: NURSE PRACTITIONER

## 2020-09-19 LAB
BACTERIA SPEC AEROBE CULT: ABNORMAL
CIPROFLOXACIN SUSC ISLT: <=0.5
CLINDAMYCIN SUSC ISLT: <=0.12
DAPTOMYCIN SUSC ISLT: 0.5
DOXYCYCLINE SUSC ISLT: 1
ERYTHROMYCIN SUSC ISLT: <=0.25
GENTAMICIN SUSC ISLT: <=0.5
LEVOFLOXACIN SUSC ISLT: 0.25
OXACILLIN SUSC ISLT: <=0.25
RIFAMPIN SUSC ISLT: <=0.5
TETRACYCLINE SUSC ISLT: >=16
TIGECYCLINE SUSC ISLT: <=0.12
TMP SMX SUSC ISLT: <=10
VANCOMYCIN SUSC ISLT: 1

## 2020-09-25 ENCOUNTER — HOSPITAL ENCOUNTER (OUTPATIENT)
Dept: PREADMISSION TESTING | Facility: HOSPITAL | Age: 65
Discharge: HOME | End: 2020-09-25
Attending: OPHTHALMOLOGY

## 2020-09-27 LAB — SARS-COV-2 RNA SPEC QL NAA+PROBE: NOT DETECTED

## 2020-09-30 ENCOUNTER — HOSPITAL ENCOUNTER (OUTPATIENT)
Dept: PERIOP | Facility: HOSPITAL | Age: 65
Setting detail: HOSPITAL OUTPATIENT SURGERY
Discharge: HOME | End: 2020-09-30
Attending: OPHTHALMOLOGY

## 2020-10-14 ENCOUNTER — OFFICE VISIT CONVERTED (OUTPATIENT)
Dept: FAMILY MEDICINE CLINIC | Facility: CLINIC | Age: 65
End: 2020-10-14
Attending: NURSE PRACTITIONER

## 2020-10-14 ENCOUNTER — HOSPITAL ENCOUNTER (OUTPATIENT)
Dept: FAMILY MEDICINE CLINIC | Facility: CLINIC | Age: 65
Discharge: HOME | End: 2020-10-14
Attending: NURSE PRACTITIONER

## 2020-10-17 LAB
AMPICILLIN SUSC ISLT: <=0.25
AMPICILLIN+SULBAC SUSC ISLT: <=2
BACTERIA SPEC AEROBE CULT: ABNORMAL
CEFOTAXIME SUSC ISLT: <=0.12
CEFTAZIDIME SUSC ISLT: 8
CEFTRIAXONE SUSC ISLT: <=0.12
CIPROFLOXACIN SUSC ISLT: <=0.25
CLINDAMYCIN SUSC ISLT: <=0.25
GENTAMICIN SUSC ISLT: <=1
LEVOFLOXACIN SUSC ISLT: 4
LEVOFLOXACIN SUSC ISLT: <=0.12
PENICILLIN G SUSC ISLT: 0.12
PIP+TAZO SUSC ISLT: <=4
TETRACYCLINE SUSC ISLT: >=16
TIGECYCLINE SUSC ISLT: <=0.06
TMP SMX SUSC ISLT: <=20
TOBRAMYCIN SUSC ISLT: 2
VANCOMYCIN SUSC ISLT: 0.5

## 2020-10-21 ENCOUNTER — OFFICE VISIT CONVERTED (OUTPATIENT)
Dept: FAMILY MEDICINE CLINIC | Facility: CLINIC | Age: 65
End: 2020-10-21
Attending: NURSE PRACTITIONER

## 2020-10-22 ENCOUNTER — HOSPITAL ENCOUNTER (OUTPATIENT)
Dept: OTHER | Facility: HOSPITAL | Age: 65
Discharge: HOME | End: 2020-10-22
Attending: NURSE PRACTITIONER

## 2020-11-20 ENCOUNTER — OFFICE VISIT CONVERTED (OUTPATIENT)
Dept: CARDIOLOGY | Facility: CLINIC | Age: 65
End: 2020-11-20
Attending: INTERNAL MEDICINE

## 2020-12-01 ENCOUNTER — HOSPITAL ENCOUNTER (OUTPATIENT)
Dept: OTHER | Facility: HOSPITAL | Age: 65
Discharge: HOME | End: 2020-12-01
Attending: INTERNAL MEDICINE

## 2020-12-01 LAB
ALBUMIN SERPL-MCNC: 3.4 G/DL (ref 3.5–5)
ALBUMIN/GLOB SERPL: 1.1 {RATIO} (ref 1.4–2.6)
ALP SERPL-CCNC: 146 U/L (ref 43–160)
ALT SERPL-CCNC: 34 U/L (ref 10–40)
ANION GAP SERPL CALC-SCNC: 16 MMOL/L (ref 8–19)
AST SERPL-CCNC: 19 U/L (ref 15–50)
BILIRUB SERPL-MCNC: 0.23 MG/DL (ref 0.2–1.3)
BNP SERPL-MCNC: 188 PG/ML (ref 0–900)
BUN SERPL-MCNC: 18 MG/DL (ref 5–25)
BUN/CREAT SERPL: 15 {RATIO} (ref 6–20)
CALCIUM SERPL-MCNC: 10 MG/DL (ref 8.7–10.4)
CHLORIDE SERPL-SCNC: 94 MMOL/L (ref 99–111)
CONV CO2: 31 MMOL/L (ref 22–32)
CONV TOTAL PROTEIN: 6.6 G/DL (ref 6.3–8.2)
CREAT UR-MCNC: 1.18 MG/DL (ref 0.5–0.9)
GFR SERPLBLD BASED ON 1.73 SQ M-ARVRAT: 49 ML/MIN/{1.73_M2}
GLOBULIN UR ELPH-MCNC: 3.2 G/DL (ref 2–3.5)
GLUCOSE SERPL-MCNC: 274 MG/DL (ref 65–99)
OSMOLALITY SERPL CALC.SUM OF ELEC: 296 MOSM/KG (ref 273–304)
POTASSIUM SERPL-SCNC: 3.6 MMOL/L (ref 3.5–5.3)
SODIUM SERPL-SCNC: 137 MMOL/L (ref 135–147)
T4 FREE SERPL-MCNC: 1.2 NG/DL (ref 0.9–1.8)
TSH SERPL-ACNC: 1.75 M[IU]/L (ref 0.27–4.2)

## 2020-12-10 ENCOUNTER — CONVERSION ENCOUNTER (OUTPATIENT)
Dept: OTOLARYNGOLOGY | Facility: CLINIC | Age: 65
End: 2020-12-10
Attending: NURSE PRACTITIONER

## 2020-12-11 ENCOUNTER — HOSPITAL ENCOUNTER (OUTPATIENT)
Dept: CARDIOLOGY | Facility: HOSPITAL | Age: 65
Discharge: HOME | End: 2020-12-11
Attending: INTERNAL MEDICINE

## 2020-12-14 ENCOUNTER — HOSPITAL ENCOUNTER (OUTPATIENT)
Dept: FAMILY MEDICINE CLINIC | Facility: CLINIC | Age: 65
Discharge: HOME | End: 2020-12-14
Attending: NURSE PRACTITIONER

## 2020-12-14 ENCOUNTER — CONVERSION ENCOUNTER (OUTPATIENT)
Dept: FAMILY MEDICINE CLINIC | Facility: CLINIC | Age: 65
End: 2020-12-14

## 2020-12-14 ENCOUNTER — OFFICE VISIT CONVERTED (OUTPATIENT)
Dept: FAMILY MEDICINE CLINIC | Facility: CLINIC | Age: 65
End: 2020-12-14
Attending: NURSE PRACTITIONER

## 2020-12-16 LAB
BACTERIA SPEC AEROBE CULT: NORMAL
BACTERIA SPEC AEROBE CULT: NORMAL

## 2020-12-30 ENCOUNTER — OFFICE VISIT CONVERTED (OUTPATIENT)
Dept: FAMILY MEDICINE CLINIC | Facility: CLINIC | Age: 65
End: 2020-12-30
Attending: NURSE PRACTITIONER

## 2020-12-30 ENCOUNTER — CONVERSION ENCOUNTER (OUTPATIENT)
Dept: FAMILY MEDICINE CLINIC | Facility: CLINIC | Age: 65
End: 2020-12-30

## 2021-01-15 ENCOUNTER — OFFICE VISIT CONVERTED (OUTPATIENT)
Dept: FAMILY MEDICINE CLINIC | Facility: CLINIC | Age: 66
End: 2021-01-15
Attending: NURSE PRACTITIONER

## 2021-02-04 ENCOUNTER — TELEMEDICINE CONVERTED (OUTPATIENT)
Dept: FAMILY MEDICINE CLINIC | Facility: CLINIC | Age: 66
End: 2021-02-04
Attending: NURSE PRACTITIONER

## 2021-03-15 ENCOUNTER — CONVERSION ENCOUNTER (OUTPATIENT)
Dept: FAMILY MEDICINE CLINIC | Facility: CLINIC | Age: 66
End: 2021-03-15

## 2021-03-15 ENCOUNTER — OFFICE VISIT CONVERTED (OUTPATIENT)
Dept: FAMILY MEDICINE CLINIC | Facility: CLINIC | Age: 66
End: 2021-03-15
Attending: NURSE PRACTITIONER

## 2021-03-31 ENCOUNTER — OFFICE VISIT CONVERTED (OUTPATIENT)
Dept: FAMILY MEDICINE CLINIC | Facility: CLINIC | Age: 66
End: 2021-03-31
Attending: NURSE PRACTITIONER

## 2021-04-14 ENCOUNTER — OFFICE VISIT CONVERTED (OUTPATIENT)
Dept: FAMILY MEDICINE CLINIC | Facility: CLINIC | Age: 66
End: 2021-04-14
Attending: NURSE PRACTITIONER

## 2021-04-14 ENCOUNTER — HOSPITAL ENCOUNTER (OUTPATIENT)
Dept: FAMILY MEDICINE CLINIC | Facility: CLINIC | Age: 66
Discharge: HOME | End: 2021-04-14
Attending: NURSE PRACTITIONER

## 2021-04-14 LAB
BASOPHILS # BLD AUTO: 0.02 10*3/UL (ref 0–0.2)
BASOPHILS NFR BLD AUTO: 0.2 % (ref 0–3)
CONV ABS IMM GRAN: 0.11 10*3/UL (ref 0–0.2)
CONV ANISOCYTES: NORMAL
CONV IMMATURE GRAN: 0.8 % (ref 0–1.8)
DEPRECATED RDW RBC AUTO: 86.6 FL (ref 36.4–46.3)
EOSINOPHIL # BLD AUTO: 0 % (ref 0–7)
EOSINOPHIL # BLD AUTO: 0 10*3/UL (ref 0–0.7)
ERYTHROCYTE [DISTWIDTH] IN BLOOD BY AUTOMATED COUNT: 22.2 % (ref 11.7–14.4)
EST. AVERAGE GLUCOSE BLD GHB EST-MCNC: 154 MG/DL
HBA1C MFR BLD: 7 % (ref 3.5–5.7)
HCT VFR BLD AUTO: 35.8 % (ref 37–47)
HGB BLD-MCNC: 10.7 G/DL (ref 12–16)
LYMPHOCYTES # BLD AUTO: 0.63 10*3/UL (ref 1–5)
LYMPHOCYTES NFR BLD AUTO: 4.8 % (ref 20–45)
MACROCYTES BLD QL SMEAR: SLIGHT
MCH RBC QN AUTO: 32.3 PG (ref 27–31)
MCHC RBC AUTO-ENTMCNC: 29.9 G/DL (ref 33–37)
MCV RBC AUTO: 108.2 FL (ref 81–99)
MONOCYTES # BLD AUTO: 0.26 10*3/UL (ref 0.2–1.2)
MONOCYTES NFR BLD AUTO: 2 % (ref 3–10)
NEUTROPHILS # BLD AUTO: 12.07 10*3/UL (ref 2–8)
NEUTROPHILS NFR BLD AUTO: 92.2 % (ref 30–85)
NRBC CBCN: 0.2 % (ref 0–0.7)
PLATELET # BLD AUTO: 338 10*3/UL (ref 130–400)
PMV BLD AUTO: 10.4 FL (ref 9.4–12.3)
RBC # BLD AUTO: 3.31 10*6/UL (ref 4.2–5.4)
STOMATOCYTES BLD QL SMEAR: NORMAL
WBC # BLD AUTO: 13.09 10*3/UL (ref 4.8–10.8)

## 2021-04-15 LAB
25(OH)D3 SERPL-MCNC: 21.5 NG/ML (ref 30–100)
ALBUMIN SERPL-MCNC: 3.6 G/DL (ref 3.5–5)
ALBUMIN/GLOB SERPL: 1.4 {RATIO} (ref 1.4–2.6)
ALP SERPL-CCNC: 125 U/L (ref 43–160)
ALT SERPL-CCNC: 18 U/L (ref 10–40)
ANION GAP SERPL CALC-SCNC: 23 MMOL/L (ref 8–19)
AST SERPL-CCNC: 21 U/L (ref 15–50)
BILIRUB SERPL-MCNC: 0.59 MG/DL (ref 0.2–1.3)
BUN SERPL-MCNC: 32 MG/DL (ref 5–25)
BUN/CREAT SERPL: 27 {RATIO} (ref 6–20)
CALCIUM SERPL-MCNC: 10.3 MG/DL (ref 8.7–10.4)
CHLORIDE SERPL-SCNC: 89 MMOL/L (ref 99–111)
CONV CO2: 30 MMOL/L (ref 22–32)
CONV TOTAL PROTEIN: 6.2 G/DL (ref 6.3–8.2)
CREAT UR-MCNC: 1.2 MG/DL (ref 0.5–0.9)
FERRITIN SERPL-MCNC: 375 NG/ML (ref 10–200)
FOLATE SERPL-MCNC: >20 NG/ML (ref 4.8–20)
GFR SERPLBLD BASED ON 1.73 SQ M-ARVRAT: 48 ML/MIN/{1.73_M2}
GLOBULIN UR ELPH-MCNC: 2.6 G/DL (ref 2–3.5)
GLUCOSE SERPL-MCNC: 268 MG/DL (ref 65–99)
IRON SATN MFR SERPL: 21 % (ref 20–55)
IRON SERPL-MCNC: 77 UG/DL (ref 60–170)
OSMOLALITY SERPL CALC.SUM OF ELEC: 300 MOSM/KG (ref 273–304)
POTASSIUM SERPL-SCNC: 4.7 MMOL/L (ref 3.5–5.3)
SODIUM SERPL-SCNC: 137 MMOL/L (ref 135–147)
T4 FREE SERPL-MCNC: 1.4 NG/DL (ref 0.9–1.8)
TIBC SERPL-MCNC: 373 UG/DL (ref 245–450)
TRANSFERRIN SERPL-MCNC: 261 MG/DL (ref 250–380)
TSH SERPL-ACNC: 0.96 M[IU]/L (ref 0.27–4.2)
VIT B12 SERPL-MCNC: >2000 PG/ML (ref 211–911)

## 2021-04-27 ENCOUNTER — OFFICE VISIT CONVERTED (OUTPATIENT)
Dept: FAMILY MEDICINE CLINIC | Facility: CLINIC | Age: 66
End: 2021-04-27
Attending: NURSE PRACTITIONER

## 2021-04-27 ENCOUNTER — CONVERSION ENCOUNTER (OUTPATIENT)
Dept: FAMILY MEDICINE CLINIC | Facility: CLINIC | Age: 66
End: 2021-04-27

## 2021-04-27 ENCOUNTER — HOSPITAL ENCOUNTER (OUTPATIENT)
Dept: FAMILY MEDICINE CLINIC | Facility: CLINIC | Age: 66
Discharge: HOME | End: 2021-04-27
Attending: NURSE PRACTITIONER

## 2021-04-30 ENCOUNTER — PATIENT OUTREACH - CONVERTED (OUTPATIENT)
Dept: FAMILY MEDICINE CLINIC | Facility: CLINIC | Age: 66
End: 2021-04-30
Attending: NURSE PRACTITIONER

## 2021-05-01 LAB
AMPICILLIN SUSC ISLT: <=2
AMPICILLIN SUSC ISLT: >=32
AMPICILLIN+SULBAC SUSC ISLT: 16
AMPICILLIN+SULBAC SUSC ISLT: <=2
BACTERIA SPEC AEROBE CULT: ABNORMAL
CEFAZOLIN SUSC ISLT: 8
CEFAZOLIN SUSC ISLT: <=4
CEFEPIME SUSC ISLT: <=0.12
CEFEPIME SUSC ISLT: <=0.12
CEFTAZIDIME SUSC ISLT: <=1
CEFTAZIDIME SUSC ISLT: <=1
CEFTRIAXONE SUSC ISLT: <=0.25
CEFTRIAXONE SUSC ISLT: <=0.25
CIPROFLOXACIN SUSC ISLT: <=0.25
CIPROFLOXACIN SUSC ISLT: <=0.25
ERTAPENEM SUSC ISLT: <=0.12
ERTAPENEM SUSC ISLT: <=0.12
GENTAMICIN SUSC ISLT: <=1
GENTAMICIN SUSC ISLT: <=1
LEVOFLOXACIN SUSC ISLT: <=0.12
LEVOFLOXACIN SUSC ISLT: <=0.12
PIP+TAZO SUSC ISLT: <=4
PIP+TAZO SUSC ISLT: <=4
TMP SMX SUSC ISLT: <=20
TMP SMX SUSC ISLT: >=320
TOBRAMYCIN SUSC ISLT: <=1
TOBRAMYCIN SUSC ISLT: <=1

## 2021-05-10 NOTE — H&P
History and Physical      Patient Name: Will Apodaca   Patient ID: [id number]   Sex: Female   YOB: 1955    Primary Care Provider: Ruiz KHAN   Referring Provider: Ruiz KHAN    Visit Date: December 10, 2020    Provider: ERIN Rust   Location: [facility]   Location Address: [address] [place] [address], [address]  [id number]   Location Phone: [phone]          Chief Complaint     1.  Hearing loss    2.  Tinnitus       History Of Present Illness  Will Apodaca is a 65 year old /White female who presents to the office today as a consult from Ruiz KHAN.      She presents the clinic today for evaluation of her ears and hearing.  She reports that she has had a longstanding history with hearing loss and was last seen in the 1980s by an ENT specialist.  She reports that she was told she had lost low frequency hearing and was getting a hearing aid.  She has been wearing the same hearing aid for the past 40 years.  She reports that she feels like her hearing is getting worse.  She also has bilateral ear ringing occurs intermittently.  She denies any history of recurrent otitis media infections that she can remember.  She does not have any otalgia or otorrhea.  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       Past Medical History  Anxiety; Broken Bones; Hearing loss; Hypercalcemia; Pemphigus foliaceous; Tinnitus         Past Surgical History  Correction of right clubfoot         Medication List  Acidophilus oral capsule; Advair -21 mcg/actuation inhalation HFA aerosol inhaler; albuterol sulfate 90 mcg/actuation inhalation HFA aerosol inhaler; betamethasone dipropionate 0.05 % topical ointment; cyclobenzaprine 10 mg oral tablet; fluticasone propionate 50 mcg/actuation nasal spray,suspension; hydroxyzine HCl 25 mg oral tablet; Iron (ferrous sulfate) 325  "mg (65 mg iron) oral tablet; Lasix 80 mg oral tablet; prednisone 20 mg oral tablet; tramadol 50 mg oral tablet; Ventolin HFA 90 mcg/actuation inhalation HFA aerosol inhaler; Vitamin D3 125 mcg (5,000 unit) oral tablet         Allergy List  NO KNOWN DRUG ALLERGIES       Allergies Reconciled  Family Medical History  Family history of stroke; Family history of heart disease         Social History  Alcohol (Never); Tobacco (Never)         Immunizations  Name Date Admin   Influenza Refused   Influenza Refused         Review of Systems  · Constitutional  o Denies  o : fever, night sweats, weight loss  · Eyes  o Denies  o : discharge from eye, impaired vision  · HENT  o Admits  o : *See HPI  · Cardiovascular  o Denies  o : chest pain, irregular heart beats  · Respiratory  o Admits  o : shortness of breath  o Denies  o : wheezing, coughing up blood  · Gastrointestinal  o Admits  o : heartburn, reflux  o Denies  o : vomiting blood  · Genitourinary  o Admits  o : frequency  · Integument  o Admits  o : rash, skin dryness  · Neurologic  o Admits  o : loss of balance, dizziness  o Denies  o : seizures, loss of consciousness  · Endocrine  o Denies  o : cold intolerance, heat intolerance  · Heme-Lymph  o Admits  o : easy bleeding, anemia      Vitals  Date Time BP Position Site L\R Cuff Size HR RR TEMP (F) WT  HT  BMI kg/m2 BSA m2 O2 Sat FR L/min FiO2        12/10/2020 01:41 PM        97.5 302lbs 0oz 5'  4\" 51.84 2.49             Physical Examination  · Constitutional  o Appearance  o : well developed, well-nourished, alert and in no acute distress, voice clear and strong  · Head and Face  o Head  o :   § Inspection  § : no deformities or lesions  o Face  o :   § Inspection  § : No facial lesions; House-Brackmann I/VI bilaterally  § Palpation  § : No TMJ crepitus nor  muscle tenderness bilaterally  · Eyes  o Vision  o :   § Visual Fields  § : Extraocular movements are intact. No spontaneous or gaze-induced " nystagmus.  o Conjunctivae  o : clear  o Sclerae  o : clear  o Pupils and Irises  o : pupils equal, round, and reactive to light.   · Ears, Nose, Mouth and Throat  o Ears  o :   § External Ears  § : appearance within normal limits, dry flaky skin and crusty, scabby lesions around the ears and down neck  § Otoscopic Examination  § : tympanic membrane appearance within normal limits bilaterally without perforations, well-aerated middle ears  § Hearing  § : intact to conversational voice both ears  o Nose  o :   § External Nose  § : appearance normal  § Intranasal Exam  § : mucosa within normal limits, vestibules normal, no intranasal lesions present, septum midline, sinuses non tender to percussion  o Oral Cavity  o :   § Oral Mucosa  § : oral mucosa normal without pallor or cyanosis  § Lips  § : lip appearance normal  § Teeth  § : normal dentition for age  § Gums  § : gums pink, non-swollen, no bleeding present  § Tongue  § : tongue appearance normal; normal mobility  § Palate  § : hard palate normal, soft palate appearance normal with symmetric mobility  o Throat  o :   § Oropharynx  § : no inflammation or lesions present, tonsils within normal limits  · Neck  o Inspection/Palpation  o : normal appearance, no masses or tenderness, trachea midline; thyroid size normal, nontender, no nodules or masses present on palpation  · Respiratory  o Respiratory Effort  o : breathing unlabored  o Inspection of Chest  o : normal appearance, no retractions  · Lymphatic  o Neck  o : no lymphadenopathy present  o Supraclavicular Nodes  o : no lymphadenopathy present  o Preauricular Nodes  o : no lymphadenopathy present  · Skin and Subcutaneous Tissue  o General Inspection  o : Regarding face and neck - there are no rashes present, no lesions present, and no areas of discoloration  · Neurologic  o Cranial Nerves  o : cranial nerves II-XII are grossly intact bilaterally  o Gait and Station  o : normal gait, able to stand without  diffculty  · Psychiatric  o Judgement and Insight  o : judgment and insight intact  o Mood and Affect  o : mood normal, affect appropriate          Assessment  · Sensorineural hearing loss (SNHL), bilateral     389.18/H90.3  · Tinnitus, bilateral     388.30/H93.13      Plan  · Orders  o Audiometry, pure-tone (threshold); [facility] bone (51427) - 388.30/H93.13, 389.18/H90.3 - 12/10/2020  o Tympanogram (Impedance Testing) [facility] ([id number]) - 388.30/H93.13, 389.18/H90.3 - 12/10/2020  · Medications  o Medications have been Reconciled  o Transition of Care or Provider Policy  · Instructions  o She presents the clinic today for evaluation of her ears and hearing. She reports that she has had a longstanding history with hearing loss and was last seen in the 1980s by an ENT specialist. She reports that she was told she had lost low frequency hearing and was getting a hearing aid. She has been wearing the same hearing aid for the past 40 years. She reports that she feels like her hearing is getting worse. She also has bilateral ear ringing occurs intermittently. She denies any history of recurrent otitis media infections that she can remember. She does not have any otalgia or otorrhea. 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 On examination today bilateral external auditory canals and bilateral tympanic membrane appearance is within normal limits. There are no perforations and middle ears are well aerated. We did obtain an audiogram and tympanogram. Audiogram shows the left ear with a moderate to severe sensorineural hearing loss. The right ear has a severe to profound likely sensorineural hearing loss. On the right ear there is no recognition of sound in 110 dB. Speech reception threshold of the left ear was at 60 dB. Word discrimination scores were not assessed on the right and 144% on the left at 75 dB. Right tympanogram shows slightly low compliance in the  left tympanogram was normal. I have gone over the results of the audiogram with the patient and given her a copy. Her caregiver reports that they are scheduled to go to [facility] to be fitted for hearing aids. I will plan to see her back on an as-needed basis.  o Electronically Identified Patient Education Materials Provided Electronically  · Correspondence  o ENT Letter to Referring MD (Ruiz KHAN) - 12/10/2020            Electronically Signed by: ERIN Rust -Author on December 10, 2020 03:20:50 PM

## 2021-05-10 NOTE — H&P
History and Physical      Patient Name: Will Apodaca   Patient ID: [id number]   Sex: Female   YOB: 1955    Primary Care Provider: Ruiz KHAN   Referring Provider: Ruiz KHAN    Visit Date: November 20, 2020    Provider: Gary Kessler MD   Location: [facility]   Location Address: [address] [place] [address], [facility]   [place]  [id number]   Location Phone: [phone]          History Of Present Illness  Consult requested by: Ruiz KHAN   I saw Will Apodaca in the office today. This is a 65 year old, /White female. She has no previous cardiac history. She has had lower-extremity edema for many months, which has progressively worsened. She has had a dermatologic disorder over the past year, which has required multiple therapies and high-dose steroids chronically. Over the past 2 months, the patient has had weeping lower-extremity edema, increased shortness of breath, orthopnea. She has been sleeping up in a chair over the past 6 months. She was started on low-dose Lasix, but it has not had much of an effect. She intermittent tightness in her chest, especially when she gets upset, and shortness of breath with exertion.   PAST MEDICAL HISTORY: includes morbid obesity; dermatologic disorder, details not known, requiring immunosuppressant therapy; arthritis. PAST SURGICAL HISTORY: Foot surgery; tubal pregnancy.   PSYCHOSOCIAL HISTORY: Positive for mood changes and depression. She never used alcohol or tobacco. She is .   FAMILY HISTORY: Positive for diabetes, hypertension and heart disease.   CURRENT MEDICATIONS: include Triamcinolone cream; horse chestnut 300 mg b.i.d.; Hydroxyzine 25 mg daily; Alendronate; probiotic; Vitamin D3; Albuterol; Cyclobenzaprine 10 mg daily; iron 65 mg b.i.d.; aspirin 81 mg daily; prednisone; Lasix 20 mg daily. The dosage and frequency of the medications were reviewed with the patient.   ALLERGIES: No known  "drug allergies.       Review of Systems  · Constitutional  o Admits  o : fatigue, good general health lately, recent weight changes   · Eyes  o Admits  o : blurred vision  o Denies  o : double vision  · HENT  o Admits  o : hearing loss or ringing, chronic sinus problem  o Denies  o : swollen glands in neck  · Cardiovascular  o Admits  o : chest pain, palpitations (fast, fluttering, or skipping beats), swelling (feet, ankles, hands), shortness of breath while walking or lying flat  · Respiratory  o Admits  o : chronic or frequent cough, asthma or wheezing  o Denies  o : COPD  · Gastrointestinal  o Denies  o : ulcers, nausea or vomiting  · Neurologic  o Admits  o : lightheaded or dizzy, headaches  o Denies  o : stroke  · Musculoskeletal  o Admits  o : joint pain, back pain  · Endocrine  o Admits  o : heat or cold intolerance, excessive thirst or urination  o Denies  o : thyroid disease, diabetes  · Heme-Lymph  o Admits  o : bleeding or bruising tendency, anemia      Vitals  Date Time BP Position Site L\R Cuff Size HR RR TEMP (F) WT  HT  BMI kg/m2 BSA m2 O2 Sat FR L/min FiO2 HC       11/20/2020 10:20 /78 Sitting    96 - R   311lbs 0oz 5'  4\" 53.38 2.52             Physical Examination  · Constitutional  o Appearance  o : Morbidly obese, white female, in no acute distress.  · Head and Face  o HEENT  o : No pallor, anicteric. Eyes normal. Moist mucous membranes.  · Neck  o Inspection/Palpation  o : Supple. No hepatosplenomegaly.  o Jugular Veins  o : No JVD. No carotid bruits.  · Respiratory  o Auscultation of Lungs  o : Clear to auscultation bilaterally. No crackles or wheezing.  · Cardiovascular  o Heart  o : S1, S2 is normally heard. No S3. No murmur, rubs, or gallops.  · Gastrointestinal  o Abdominal Examination  o : Soft, non-distended. No palpable hepatosplenomegaly. Bowel sounds heard in all four quadrants. She has dependent areas of the abdomen.  · Musculoskeletal  o General  o : Normal muscle tone and " strength.  · Skin and Subcutaneous Tissue  o General Inspection  o : She has multiple cutaneous skin lesions of various degrees of healing over her forearms.   · Extremities  o Extremities  o : Warm and well perfused. Distal pulses present. She has 3+ weeping edema of the lower extremities.     EKG was performed in the office today.  Indication:       edema, shortness of breath.  Results:          sinus rhythm, normal axis and intervals.  Normal EKG.    She had recent basic laboratory studies, which are unremarkable.  This was back in August.      Primary care records were reviewed.              Assessment     Volume overload - This is likely multifactorial related to morbid obesity, high-dose chronic steroid use, sleeping in a chair, venous insufficiency, all of which can contribute to her fluid retention.  She has been on a very low dose of Lasix, which is clearly ineffective.  She has no cardiac history and has not had any recent cardiac workup.  Her baseline EKG is normal.         Plan     Presuming the patient cannot be weaned off of steroids further at this point, more aggressive diuretics are  necessary.  I am increasing her Lasix to 40 mg b.i.d., taking it in the morning and early afternoon, along with 20 mEq b.i.d. of Potassium.  An echocardiogram will be obtained with contrast and bubble study. Labs will be checked in approximately 2 weeks, including CMP, magnesium, thyroid panel.  I have instructed the patient to weigh herself every day.  We should be seeing slow, consistent weight loss with the diuretic.  She probably has at least 20 to 30 pounds of fluid to lose at this point.  If she does not see significant improvement over the next week, she needs to call the office.  If she becomes more edematous, more short of breath, she will need to come into the hospital for more aggressive IV diuretics.  The patient is agreeable with this plan.  I will see her back in the office in approximately 1 month for a  follow-up otherwise.    It is a pleasure to assist in her care.    RICHARD solitario/etelvina           This note was transcribed by Diana Yin.  dmd/cbd  The above service was transcribed by Diana Yin, and I attest to the accuracy of the note.  CBD.             Electronically Signed by: Diana Yin-, -Author on November 25, 2020 07:07:59 AM  Electronically Co-signed by: Gary Kessler MD -Reviewer on November 30, 2020 06:47:06 AM

## 2021-05-11 NOTE — OUTREACH NOTE
Quick Note      Patient Name: Will Apodaca   Patient ID: [id number]   Sex: Female   YOB: 1955    Primary Care Provider: Ruiz KHAN   Referring Provider: Ruiz KHAN    Visit Date: April 30, 2021    Provider: ERIN Bello   Location: [facility]   Location Address: [address] [place] [address], [address]  [id number]   Location Phone: [phone]          History Of Present Illness     [facility]     TaskID: [id number]    Task Subject: CCM Note April 2021    Task Comments:    Date: Apr 27 2021 12:16PM     Creator: dirk  The patients niece called and stated that the patient was having issues with the sores on her legs and that they were still weeping profusly also red and sore to the touch. I stated I was concirnd aboiut posible infection / sepsis . I asked if the patient could be seen today by another provider since her provider was not in the office and she stated she could bring her in @ 3:00 today . I talked with Mr. Finney and he agreed to see the patient today at 3:00 . The niece stated that she would have patient here today . ( 8 min )    Date: Apr 22 2021  2:56PM     Creator: dirk  Task sent to pcp to ask her to amend last progress note to indicate that the patient needed the required DME because of her body configueration and inability to transfer normally. PCP aware. ( 8 min)    Date: Apr 20 2021  3:23PM     Creator: dirk  I was notified by [facility] that the DWO that was sent was ok but they would need an updated Progress note that explains why the W/C and commode are needed. I explained that I would get with PCP next Date to discuss. ( 8 )    Date: Apr 19 2021  9:33AM     Creator: dirk  As per request I resubmitted the DWO for the batients DME with the adjusted weights listed as well as change fro Bariatric to heavy duty equipment. ( 12 min)    Date: Apr 15 2021  3:12PM     Creator: dirk  I called and confirmed that the patient was to be  seen on May 24 @ 9:00 patient care giver aware and asked that the date be emailed to her email. ( 8 min )    Date: Apr 15 2021  1:21PM     Creator: dirk  DME request was completed and signed by PCP and faxed to [employer] to fill . I called Bryon and tutued I would be faxing the request so they are aware of the request. ( prep and call time 19 min )    Date: Apr 15 2021  1:19PM     Creator: dirk  Per request of PCP i was able to reach out to the patient caregiver ( Jemima # [phone]) I explained the CCM service and it was agreed to start patient on the service. We discussed the patients needs and it was determined that the patient was in need of a Bariatric W/C, Bariatric Bed side commode, and a hospital bed . I stated that I would reach out to the DME Co. today and arrange for the delivery of the items. I asked if they would preferr certain DME Co[employer] and she declined. The patient was supplied with CCM contact #. She asked if we could check and see if we could determin where the lung Dr. she had was located she was not sure of his name. I stated I would and I would return her call before the end of bussiness today and she agreed, She provided her email address as well [email or web address]. ( total time 23 min )             Plan  · Medications  o Medications have been Reconciled  o Transition of Care or Provider Policy            Electronically Signed by: Trace Caicedo MA -Author on April 30, 2021 10:20:44 AM

## 2021-05-11 NOTE — OUTREACH NOTE
Quick Note      Patient Name: Will Apodaca   Patient ID: [id number]   Sex: Female   YOB: 1955    Primary Care Provider: Ruiz KHAN   Referring Provider: Ruiz KHAN    Visit Date: April 30, 2021    Provider: ERIN Bello   Location: [facility]   Location Address: [address] [place] [address], [address]  [id number]   Location Phone: [phone]          History Of Present Illness  CCM Comprehensive Care Plan    This Chronic Medical Management Care Plan for Will Apodaca, 65 year old /White female, has been monitored and managed, reviewed, revised, and a new plan of care implemented, and established for the month of April. A cumulative time of 86 minutes was spent on this patient record, including face to face with provider, phone call with primary care provider, chart review, and phone call to DME.   Regarding the patient's diagnoses Anxiety, Cellulitis, CHF (congestive heart failure), Diabetes, and Hearing loss, the following items were adressed: medical records and medications and any changes can be found within the plan section of the note. A detailed listing of time spent for chronic care management has been scanned into the patient's electronic record. Current medications include: MEDICATION LIST and the patient is reported to be compliant with medication protocol. Medications are reported to be effective. Regarding these diagnoses, referrals were made to the following provider/s N/A.   The patient was monitored remotely for weight, activity level, and blood glucose for a period of 86 minutes.   This patient's physical needs include: hearing care, needs assistance with ADLs, physical healthcare, and DME supplies. Patient is very limited as to her mobility and is unable to do a lot of basic tasks. patient requires DME in the form of W/C, Hospital Bed , and Bed Side commode..   Patient's mental support needs include: continued  support. Patient will require ongoing support for anxiety..   The patient's cognitive support needs are currently being met.   The patient's psychosocial support needs are N/A,   This patient's functional needs include: DME supplies, needs assistance for ADLs, physical healthcare, and resources for disability needs. All needs as charted.   This patient's environmental needs include: Needs are met living with Niece at this time.. None noted.           Assessment  · Chronic Care Management (CCM)     V68.89/Z02.89  · Anxiety     300.02/F41.1  · Hearing loss     389.9/H91.90  wears hearing aids  · Cellulitis     682.9/L03.90  · CHF (congestive heart failure)     428.0/I50.9  · Diabetes     250.92      Plan  · Medications  o Medications have been Reconciled  o Transition of Care or Provider Policy  · Instructions  o Patient's Health Care Goals: regain more independence  o Provider's Health Care Goals: Over all health and wellness  o Patient was provided an electronic copy of care plan  o CCM services were explained and offered and patient has accepted these services.  o Patient has given their written consent to recieve CCM services and understands that this includes the authorization of electronic communication of medical information with other treating providers.  o Patient understands that they may stop CCM services at any time and these changes will be effective at the end of the calendar month and will effectively revocate the agreement of CCM services.  o Patient understands that only one practioner can furnish and be paid for CCM services during one calendar month. Patient also understands that there may be co-payment or deductible fees in association with CCM services.  o Patient will continue with at least monthly follow-up calls with the Nurse Navigator.  · Associate Tasks  o Task ID [id number] CCM: CCM Note April 2021            Electronically Signed by: Trace Caicedo MA -Author on April 30, 2021 10:20:08 AM

## 2021-05-13 ENCOUNTER — OFFICE VISIT CONVERTED (OUTPATIENT)
Dept: FAMILY MEDICINE CLINIC | Facility: CLINIC | Age: 66
End: 2021-05-13
Attending: NURSE PRACTITIONER

## 2021-05-13 LAB — B-HEM STREP SPEC QL CULT: NEGATIVE

## 2021-05-13 NOTE — PROGRESS NOTES
Progress Note      Patient Name: Will Apodaca   Patient ID: [id number]   Sex: Female   YOB: 1955    Primary Care Provider: Ruiz KHAN   Referring Provider: Ruiz KHAN    Visit Date: August 4, 2020    Provider: ERIN Bello   Location: [facility]   Location Address: [address] [place] [address], [address] [place] [address]  [id number]   Location Phone: [phone]          Chief Complaint  · follow-up      History Of Present Illness  Will Apodaca is a 65 year old /White female who presents for evaluation and treatment of:      Patient is a 65-year-old female who comes in for 3-month follow-up.  Patient has a history of autoimmune skin disorder, bilateral pedal edema, anemia, hypercalcemia.    Patient was seen in the emergency department 7/31/2020 for hypercalcemia.  Calcium was 12.2 at that time.  Patient was given a liter of fluids, encouraged to stop calcium and push fluids and follow back up with her PCP for repeat lab work.  She is absolutely having no symptoms with the hypercalcemia.  She is currently on long term dose of steroids due to her autoimmune skin disorder.  She is due to see dermatology tomorrow.  She has a new open wound on her left lower extremity and is afraid that it is getting infected.  She denies any fever or chills.    Patient is due for lab check, she was started on iron 3 months ago.  We will recheck today.  Also check basic labs.  Patient is agreeable treatment plan.    Patient does complain worsening shortness of breath.  Chest x-ray was done 7/31/2020 in the ER was negative for any pulmonary edema.  She is on a Ventolin inhaler that states that she uses it almost daily states it does help.  She is not currently on a preventative or maintenance dose inhaler at this time.       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 --          Past Surgical History  Procedure Name Date Notes   Correction of right clubfoot --  --          Medication List  Name Date Started Instructions   betamethasone dipropionate 0.05 % topical ointment 08/15/2019 apply a thin layer to the affected area(s) by topical route 2 times per day for 14 days   CellCept 500 mg oral tablet  take 3 tablets (1,500 mg) by oral route 2 times per day   fluticasone 50 mcg/actuation nasal spray,suspension 01/02/2018 spray 1 spray (50 mcg) in each nostril by intranasal route 2 times per day for nasal congestion and allergy symptoms   iron 325 mg (65 mg iron) oral tablet 05/28/2020 take 1 tablet (325 mg) by oral route 2 times per day for 90 days   Lasix 20 mg oral tablet 03/13/2020 take 1 tablet (20 mg) by oral route once daily PRN leg swelling   prednisone 10 mg oral tablet  take 1 tablet alternating with 0.5 tablet   tramadol 50 mg oral tablet 01/02/2020 take 1 tablet (50 mg) by oral route every 4 hours as needed for 3 days   Ventolin HFA 90 mcg/actuation inhalation HFA aerosol inhaler  inhale 2 puffs (180 mcg) by inhalation route every 6 hours as needed   Vitamin D2 1,250 mcg (50,000 unit) oral capsule 05/28/2020 take 1 capsule by oral route 1 time a week X 16 weeks   Vitamin D3 125 mcg (5,000 unit) oral tablet  take 1 tablet by oral route daily         Allergy List  Allergen Name Date Reaction Notes   NO KNOWN DRUG ALLERGIES --  --  --          Family Medical History  Disease Name Relative/Age Notes   Stroke Sister/   --    Heart Disease Father/  Mother/  Sister/40   --          Social History  Finding Status Start/Stop Quantity Notes   Alcohol Never --/-- --  --    Tobacco Never --/-- --  --          Immunizations  NameDate Admin Mfg Trade Name Lot Number Route Inj VIS Given VIS Publication   InfluenzaRefused 03/13/2020 NE Not Entered  NE NE     Comments:          Review of Systems  · Constitutional  o Denies  o : fever, fatigue, weight  "loss, weight gain  · Eyes  o Admits  o : blurred vision  o Denies  o : double vision, impaired vision  · HENT  o Denies  o : headaches, vertigo, lightheadedness  · Cardiovascular  o Admits  o : lower extremity edema  o Denies  o : claudication, chest pressure, palpitations  · Respiratory  o Admits  o : shortness of breath  o Denies  o : wheezing, cough, hemoptysis, dyspnea on exertion  · Gastrointestinal  o Denies  o : nausea, vomiting, diarrhea, constipation, abdominal pain  · Integument  o Admits  o : rash, itching, pigmentation changes, skin dryness, new skin lesions  · Musculoskeletal  o Denies  o : joint pain, joint swelling, muscle pain      Vitals  Date Time BP Position Site L\R Cuff Size HR RR TEMP (F) WT  HT  BMI kg/m2 BSA m2 O2 Sat HC       08/04/2020 01:18 /88 Sitting    94 - R  97.8 292lbs 16oz 5'  4\" 50.29 2.45 97 %          Physical Examination  · Constitutional  o Appearance  o : well-nourished, well developed, in no acute distress  · Eyes  o Conjunctivae  o : conjunctivae normal, no exudates present  o Sclerae  o : sclerae white  o Pupils and Irises  o : pupils equal and round, and reactive to light and accomodation bilaterally  o Eyelids/Ocular Adnexae  o : extra ocular movements intact  · Respiratory  o Respiratory Effort  o : breathing unlabored, no accessory muscle use  o Inspection of Chest  o : normal appearance, no retractions  o Auscultation of Lungs  o : Forced expiratory wheezing right lower lobe left middle lobe  · Cardiovascular  o Heart  o :   § Auscultation of Heart  § : regular rate and rhythm, no murmurs, gallops or rubs  o Peripheral Vascular System  o :   § Extremities  § : marked lower extremity edema present, no cyanosis, no distal hair loss, normal capillary refill  · Lymphatic  o Neck  o : no lymphadenopathy present  · Musculoskeletal  o Spine  o :   § Inspection/Palpation  § : no spinal tenderness, scoliosis or kyphosis present  · Skin and Subcutaneous Tissue  o General " Inspection  o : Ulceration and blistering of the skin scattered bilaterally lower and upper extremities consistent with pemphigus foliaceus  · Neurologic  o Mental Status Examination  o :   § Orientation  § : alert and oriented x3  § Speech/Language  § : normal speech pattern  o Gait and Station  o : normal gait, able to stand without difficulty              Assessment  · Screening for depression     V79.0/Z13.89  · Anemia     285.9/D64.9  · Hypercalcemia     275.42/E83.52  · 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 Discussed return precautions. Patient verbalized understanding is agreeable treatment plan.  · Leukocytosis, unspecified     288.60/D72.829  · Screening for glaucoma     V80.1/Z13.5      Plan  · Orders  o ACO-18: Negative screen for clinical depression using a standardized tool () - V79.0/Z13.89 - 08/04/2020   4  o B12 Folate levels (B12FO) - 285.9/D64.9 - 08/04/2020  o Iron panel (iron, TIBC, transferrin saturation) (36081, 27668, 30120) - 285.9/D64.9 - 08/04/2020  o Ferritin ser/plas (57055) - 285.9/D64.9 - 08/04/2020  o CBC with Auto Diff [facility] (17678) - V79.0/Z13.89, 275.42/E83.52, 694.4/L10.2, 288.60/D72.829 - 08/04/2020  o CMP [facility] (85081) - V79.0/Z13.89, 275.42/E83.52 - 08/04/2020  o Hgb A1c [facility] (40543) - V79.0/Z13.89, 275.42/E83.52, 694.4/L10.2, 288.60/D72.829 - 08/04/2020  o ACO-39: Current medications updated and reviewed () - - 08/04/2020  o DERMATOLOGY CONSULTATION (DERMA) - 694.4/L10.2 - 08/04/2020   Dematology at [facility]  o OPHTHALMOLOGY CONSULTATION (OPHTH) - V80.1/Z13.5 - 08/04/2020  o Wound Culture with Sensitivities if indicated [facility] (09604) - 275.42/E83.52, 694.4/L10.2, 288.60/D72.829, 285.9/D64.9 - 08/04/2020  · Medications  o Advair -21 mcg/actuation  inhalation HFA aerosol inhaler   SIG: inhale 2 puffs by inhalation route 2 times per day in the morning and evening for 30 days   DISP: (1) 8 gm canister with 5 refills  Prescribed on 08/04/2020     o Medications have been Reconciled  o Transition of Care or Provider Policy  · Instructions  o Depression Screen completed and scanned into the EMR under the designated folder within the patient's documents.  o Today's PHQ-9 result is _4_  o Take all medications as prescribed/directed.  o Patient was educated/instructed on their diagnosis, treatment and medications prior to discharge from the clinic today.  · Disposition  o Call or Return if symptoms worsen or persist.  o follow up as needed  o call the office with any questions or concerns            Electronically Signed by: ERIN Bello -Author on August 4, 2020 02:41:27 PM

## 2021-05-13 NOTE — PROGRESS NOTES
Progress Note      Patient Name: Will Apodaca   Patient ID: [id number]   Sex: Female   YOB: 1955    Primary Care Provider: Ruiz KHAN   Referring Provider: Ruiz KHAN    Visit Date: May 28, 2020    Provider: ERIN Bello   Location: [facility]   Location Address: [address] [place] [address], [address]  [id number]   Location Phone: [phone]          Chief Complaint  · follow up      History Of Present Illness  Will Apodaca is a 64 year old /White female who presents for evaluation and treatment of:      Patient is a 64-year-old female who is here for a follow-up, 3 months after being placed on iron.  Patient's last labs show that she was anemic.  Patient states that she never received her iron and has not been taking it.  She continues to have some easily bruising and fatigue, she has been taking over-the-counter vitamins that have helped minimally.    Patient has stasis dermatitis, she has been on all long-term dose of steroids and is currently being treated with CellCept per dermatology.  Patient has multiple side effects from the medication and is wanting a second opinion.  Patient would like a more holistic approach to the rash.       Past Medical History  Disease Name Date Onset Notes   Anxiety --  --    Broken Bones --  left ankle   Hearing loss --  wears hearing aids         Past Surgical History  Procedure Name Date Notes   Correction of right clubfoot --  --          Medication List  Name Date Started Instructions   betamethasone dipropionate 0.05 % topical ointment 08/15/2019 apply a thin layer to the affected area(s) by topical route 2 times per day for 14 days   Calcium 500 + D 500 mg(1,250mg) -400 unit oral tablet  take 1 tablet by oral route 2 times a day   CellCept 500 mg oral tablet  take 3 tablets (1,500 mg) by oral route 2 times per day   fluticasone 50 mcg/actuation nasal spray,suspension 01/02/2018 spray 1 spray (50 mcg) in  each nostril by intranasal route 2 times per day for nasal congestion and allergy symptoms   iron 325 mg (65 mg iron) oral tablet 05/28/2020 take 1 tablet (325 mg) by oral route 2 times per day for 90 days   Lasix 20 mg oral tablet 03/13/2020 take 1 tablet (20 mg) by oral route once daily PRN leg swelling   prednisone 10 mg oral tablet  take 1 tablet alternating with 0.5 tablet   tramadol 50 mg oral tablet 01/02/2020 take 1 tablet (50 mg) by oral route every 4 hours as needed for 3 days   Ventolin HFA 90 mcg/actuation inhalation HFA aerosol inhaler  inhale 2 puffs (180 mcg) by inhalation route every 6 hours as needed   Vitamin D2 1,250 mcg (50,000 unit) oral capsule 05/28/2020 take 1 capsule by oral route 1 time a week X 16 weeks   Vitamin D3 125 mcg (5,000 unit) oral tablet  take 1 tablet by oral route daily         Allergy List  Allergen Name Date Reaction Notes   NO KNOWN DRUG ALLERGIES --  --  --          Family Medical History  Disease Name Relative/Age Notes   Stroke Sister/   --    Heart Disease Father/  Mother/  Sister/40   --          Social History  Finding Status Start/Stop Quantity Notes   Alcohol Never --/-- --  --    Tobacco Never --/-- --  --          Immunizations  NameDate Admin Mfg Trade Name Lot Number Route Inj VIS Given VIS Publication   InfluenzaRefused 03/13/2020 NE Not Entered  NE NE     Comments:          Review of Systems  · Constitutional  o Admits  o : fatigue  o Denies  o : fever, weight loss, weight gain  · Cardiovascular  o Denies  o : lower extremity edema, claudication, chest pressure, palpitations  · Respiratory  o Denies  o : shortness of breath, wheezing, cough, hemoptysis, dyspnea on exertion  · Gastrointestinal  o Denies  o : nausea, vomiting, diarrhea, constipation, abdominal pain  · Integument  o Admits  o : rash  o Denies  o : itching, pigmentation changes  · Heme-Lymph  o Admits  o : easy bruising  o Denies  o : easy bleeding, petechiae, lymph node enlargement or  "tenderness      Vitals  Date Time BP Position Site L\R Cuff Size HR RR TEMP (F) WT  HT  BMI kg/m2 BSA m2 O2 Sat HC       05/28/2020 02:09 /86 Sitting    112 - R  98 275lbs 6oz 5'  4\" 47.27 2.37 95 %          Physical Examination  · Constitutional  o Appearance  o : well-nourished, well developed, in no acute distress  · Eyes  o Conjunctivae  o : conjunctivae normal, no exudates present  o Sclerae  o : sclerae white  o Pupils and Irises  o : pupils equal and round, and reactive to light and accomodation bilaterally  o Eyelids/Ocular Adnexae  o : extra ocular movements intact  · Respiratory  o Respiratory Effort  o : breathing unlabored, no accessory muscle use  o Inspection of Chest  o : normal appearance, no retractions  o Auscultation of Lungs  o : normal breath sounds bilaterally  · Cardiovascular  o Heart  o :   § Auscultation of Heart  § : regular rate and rhythm, no murmurs, gallops or rubs  · Neurologic  o Mental Status Examination  o :   § Orientation  § : alert and oriented x3  § Speech/Language  § : normal speech pattern  o Gait and Station  o : normal gait, able to stand without difficulty  · Psychiatric  o Judgement and Insight  o : judgment and insight intact, judgement for everyday activities and social situations within normal limits, insight intact  o Thought Processes  o : rate of thoughts normal, thought content logical  o Mood and Affect  o : mood normal, affect appropriate              Assessment  · Anemia     285.9/D64.9  Will refill her anemia, have patient follow-up in 3 months for lab work. Patient verbalized understanding is agreeable treatment plan  · Vitamin D deficiency     268.9/E55.9  · Stasis dermatitis     454.1/I87.2  Discussed with patient that there is a holistic primary care doctor in [place] that she can follow-up with to get a second opinion. Patient is agreeable treatment plan.    Problems Reconciled  Plan  · Orders  o ACO-39: Current medications updated and reviewed " () - - 05/28/2020  · Medications  o iron 325 mg (65 mg iron) oral tablet   SIG: take 1 tablet (325 mg) by oral route 2 times per day for 90 days   DISP: (180) tablets with 0 refills  Adjusted on 05/28/2020     o Vitamin D2 1,250 mcg (50,000 unit) oral capsule   SIG: take 1 capsule by oral route 1 time a week X 16 weeks   DISP: (16) capsules with 0 refills  Adjusted on 05/28/2020     o Medications have been Reconciled  o Transition of Care or Provider Policy  · Instructions  o Take all medications as prescribed/directed.  o Patient was educated/instructed on their diagnosis, treatment and medications prior to discharge from the clinic today.  o Call the office with any concerns or questions.  · Disposition  o Call or Return if symptoms worsen or persist.  o follow up as needed  o call the office with any questions or concerns  o Follow-up in 3 months            Electronically Signed by: Ruiz Bird APRN -Author on May 28, 2020 04:04:31 PM

## 2021-05-13 NOTE — PROGRESS NOTES
Progress Note      Patient Name: Will Apodaca   Patient ID: [id number]   Sex: Female   YOB: 1955    Primary Care Provider: Ruiz KHAN   Referring Provider: Ruiz KHAN    Visit Date: September 17, 2020    Provider: ERIN Bello   Location: [facility] [place]   Location Address: [address] [place] [address], [address]  [id number]   Location Phone: [phone]          Chief Complaint  · Possible ear infection  · wound right leg      History Of Present Illness  Will Apodaca is a 65 year old /White female who presents for evaluation and treatment of:      Patient is a 65-year-old female who comes in for an acute issue.  Patient has a history of autoimmune skin disease, she sees dermatology.  She has had a history of areas getting infected last 1 was August 2020 on the left leg grew out staph aureus.  Patient has an area on the right leg and she is concerned that it is getting infected.  Patient is currently on high doses of steroids due to her autoimmune skin disease.  She denies any fever or chills.    Patient is complaining of ear pain and fullness.  Ongoing for the past week.  She has a history of hearing loss and has a hearing aid.  She denies any headache, cough, or upper respiratory symptoms.  Symptoms are mild to moderate nature.  Patient is concerned that she has an ear infection.    Patient is complaining of restless legs at bedtime.  She states she is getting multiple muscle cramps at bedtime as well.  She states that her niece gave her a muscle relaxer and it did help tremendously.  Patient is inquiring if she could get something to help with the cramps and restless leg at bedtime.           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 --          Past Surgical  History  Procedure Name Date Notes   Correction of right clubfoot --  --          Medication List  Name Date Started Instructions   Advair -21 mcg/actuation inhalation HFA aerosol inhaler 08/04/2020 inhale 2 puffs by inhalation route 2 times per day in the morning and evening for 30 days   betamethasone dipropionate 0.05 % topical ointment 08/15/2019 apply a thin layer to the affected area(s) by topical route 2 times per day for 14 days   CellCept 500 mg oral tablet  take 3 tablets (1,500 mg) by oral route 2 times per day   fluticasone propionate 50 mcg/actuation nasal spray,suspension 09/17/2020 spray 1 spray (50 mcg) in each nostril by intranasal route 2 times per day for nasal congestion and allergy symptoms   Iron (ferrous sulfate) 325 mg (65 mg iron) oral tablet 09/17/2020 Take 1 tablet by mouth twice daily   Lasix 20 mg oral tablet 03/13/2020 take 1 tablet (20 mg) by oral route once daily PRN leg swelling   prednisone 10 mg oral tablet  take 1 tablet alternating with 0.5 tablet   tramadol 50 mg oral tablet 01/02/2020 take 1 tablet (50 mg) by oral route every 4 hours as needed for 3 days   Ventolin HFA 90 mcg/actuation inhalation HFA aerosol inhaler  inhale 2 puffs (180 mcg) by inhalation route every 6 hours as needed   Vitamin D2 1,250 mcg (50,000 unit) oral capsule 05/28/2020 take 1 capsule by oral route 1 time a week X 16 weeks   Vitamin D3 125 mcg (5,000 unit) oral tablet  take 1 tablet by oral route daily         Allergy List  Allergen Name Date Reaction Notes   NO KNOWN DRUG ALLERGIES --  --  --        Allergies Reconciled  Family Medical History  Disease Name Relative/Age Notes   Stroke Sister/   --    Heart Disease Father/  Mother/  Sister/40   --          Social History  Finding Status Start/Stop Quantity Notes   Alcohol Never --/-- --  --    Tobacco Never --/-- --  --          Immunizations  NameDate Admin Mfg Trade Name Lot Number Route Inj VIS Given VIS Publication   InfluenzaRefused  "09/17/2020 NE Not Entered  NE NE     Comments:    InfluenzaRefused 03/13/2020 NE Not Entered  NE NE     Comments:          Review of Systems  · Constitutional  o Denies  o : fever, fatigue, weight loss, weight gain  · HENT  o Admits  o : ear pain, ear fullness  o Denies  o : headaches, vertigo, nasal congestion, postnasal drip, sore throat  · Cardiovascular  o Denies  o : lower extremity edema, claudication, chest pressure, palpitations  · Respiratory  o Denies  o : shortness of breath, wheezing, cough, hemoptysis, dyspnea on exertion  · Gastrointestinal  o Denies  o : nausea, vomiting, diarrhea, constipation, abdominal pain  · Integument  o Admits  o : rash, changes to existing skin lesions or moles  o Denies  o : itching, pigmentation changes      Vitals  Date Time BP Position Site L\R Cuff Size HR RR TEMP (F) WT  HT  BMI kg/m2 BSA m2 O2 Sat HC       09/17/2020 11:33 /90 Sitting    105 - R  98.1 287lbs 6oz 5'  4\" 49.33 2.43 96 %          Physical Examination  · Constitutional  o Appearance  o : well-nourished, well developed, in no acute distress  · Eyes  o Conjunctivae  o : conjunctivae normal, no exudates present  o Sclerae  o : sclerae white  o Pupils and Irises  o : pupils equal and round, and reactive to light and accomodation bilaterally  o Eyelids/Ocular Adnexae  o : extra ocular movements intact  · Ears, Nose, Mouth and Throat  o Ears  o :   § External Ears  § : external auditory canal appearance within normal limits, no discharge present  § Otoscopic Examination  § : Right otitis media, left TM fluid present no erythema  · Respiratory  o Respiratory Effort  o : breathing unlabored, no accessory muscle use  o Inspection of Chest  o : normal appearance, no retractions  o Auscultation of Lungs  o : normal breath sounds bilaterally  · Cardiovascular  o Heart  o :   § Auscultation of Heart  § : regular rate and rhythm, no murmurs, gallops or rubs  o Peripheral Vascular System  o :   § Extremities  § : no " edema  · Skin and Subcutaneous Tissue  o General Inspection  o : Numerous erythematous areas located in the truncal and lower extremities as well as bilateral arms, small open wound right lower extremity ulceration appearance with serosanguineous drainage and mild cellulitic changes.  · Neurologic  o Mental Status Examination  o :   § Orientation  § : alert and oriented x3  § Speech/Language  § : normal speech pattern  o Gait and Station  o : normal gait, able to stand without difficulty              Assessment  · Anemia     285.9/D64.9  Patient needs a refill of her iron medication.  · Wound cellulitis     682.9/L03.90  Will start on Levaquin, wound culture obtained, discussed return precautions. Patient verbalized understanding is agreeable treatment plan.  · Right otitis media with effusion     381.4/H65.91  · Restless leg     333.94/G25.81  Will start on Flexeril since patient tried previously and had good outcome. Discussed return precautions. Patient verbalized understanding.    Problems Reconciled  Plan  · Orders  o ACO-39: Current medications updated and reviewed () - - 09/17/2020  o ACO-14: Influenza immunization was not administered for reasons documented () - - 09/17/2020   Declined  o Wound Culture with Sensitivities if indicated [facility] ([id number]) - 682.9/L03.90 - 09/17/2020  · Medications  o Levaquin 500 mg oral tablet   SIG: take 1 tablet (500 mg) by oral route once daily for 14 days   DISP: (14) tablets with 0 refills  Prescribed on 09/17/2020     o cyclobenzaprine 10 mg oral tablet   SIG: take 1 tablet (10 mg) by oral route once daily at bedtime for 90 days   DISP: (90) tablets with 1 refills  Prescribed on 09/17/2020     o fluticasone propionate 50 mcg/actuation nasal spray,suspension   SIG: spray 1 spray (50 mcg) in each nostril by intranasal route 2 times per day for nasal congestion and allergy symptoms   DISP: (1) 9.9 ml aer w/adap with 5 refills  Adjusted on 09/17/2020     o Iron (ferrous  sulfate) 325 mg (65 mg iron) oral tablet   SIG: Take 1 tablet by mouth twice daily   DISP: (180) Tablet with 1 refills  Adjusted on 09/17/2020     o iron 325 mg (65 mg iron) oral tablet   SIG: take 1 tablet (325 mg) by oral route 2 times per day for 90 days   DISP: (180) tablets with 0 refills  Discontinued on 09/17/2020     o Medications have been Reconciled  o Transition of Care or Provider Policy  · Instructions  o Take all medications as prescribed/directed.  o Patient was educated/instructed on their diagnosis, treatment and medications prior to discharge from the clinic today.  o Call the office with any concerns or questions.  · Disposition  o Call or Return if symptoms worsen or persist.  o follow up as needed  o call the office with any questions or concerns            Electronically Signed by: ERIN Bello -Author on September 17, 2020 12:45:36 PM

## 2021-05-13 NOTE — PROGRESS NOTES
Progress Note      Patient Name: Will Apodaca   Patient ID: [id number]   Sex: Female   YOB: 1955    Primary Care Provider: Ruiz KHAN   Referring Provider: Ruiz KHAN    Visit Date: October 14, 2020    Provider: ERIN Bello   Location: [facility]   Location Address: [address] [place] [address], [address]  [id number]   Location Phone: [phone]          Chief Complaint  · F/U      History Of Present Illness  Will Apodaca is a 65 year old /White female who presents for evaluation and treatment of:      Patient is a 65-year-old female who comes in for follow-up appointment.  Patient has a skin disorder pemphigus Foliaceous which causes sores and swelling and redness to her skin and extremities.  Patient's had infections in her lower extremities severe wound cultures.  She states her right lower extremity is doing well but her left lower extremity started swelling with redness and weeping again.  She denies any fever or chills.  It waxes and wanes in intensity.  She is currently seeing dermatology, she is currently on a long-term dose of prednisone to help with the symptoms.    Patient is complaining of ears ringing and inability to hear well.  Patient does have hearing aids, and states her last hearing exam is been many years ago.  She states her hearing aids are old.  She states she has tinnitus in both ears she is currently taking Flonase but does not feel like it is helping.  She also complains of decreased hearing.  She has not been seen ENT recently.  Patient would like a referral over to ENT for evaluation.    Patient was on hydroxyzine previously for anxiety, patient would like to get a refill of the medication to help with her anxiety.  She denies any suicidal homicidal ideations.       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 --          Past Surgical History  Procedure Name Date Notes   Correction of right clubfoot --  --          Medication List  Name Date Started Instructions   Advair -21 mcg/actuation inhalation HFA aerosol inhaler 08/04/2020 inhale 2 puffs by inhalation route 2 times per day in the morning and evening for 30 days   betamethasone dipropionate 0.05 % topical ointment 08/15/2019 apply a thin layer to the affected area(s) by topical route 2 times per day for 14 days   cyclobenzaprine 10 mg oral tablet 09/17/2020 take 1 tablet (10 mg) by oral route once daily at bedtime for 90 days   fluticasone propionate 50 mcg/actuation nasal spray,suspension 09/17/2020 spray 1 spray (50 mcg) in each nostril by intranasal route 2 times per day for nasal congestion and allergy symptoms   Iron (ferrous sulfate) 325 mg (65 mg iron) oral tablet 09/17/2020 Take 1 tablet by mouth twice daily   Lasix 20 mg oral tablet 03/13/2020 take 1 tablet (20 mg) by oral route once daily PRN leg swelling   prednisone 10 mg oral tablet  take 1 tablet alternating with 0.5 tablet   tramadol 50 mg oral tablet 01/02/2020 take 1 tablet (50 mg) by oral route every 4 hours as needed for 3 days   Ventolin HFA 90 mcg/actuation inhalation HFA aerosol inhaler  inhale 2 puffs (180 mcg) by inhalation route every 6 hours as needed   Vitamin D3 125 mcg (5,000 unit) oral tablet  take 1 tablet by oral route daily         Allergy List  Allergen Name Date Reaction Notes   NO KNOWN DRUG ALLERGIES --  --  --        Allergies Reconciled  Family Medical History  Disease Name Relative/Age Notes   Stroke Sister/   --    Heart Disease Father/  Mother/  Sister/40   --          Social History  Finding Status Start/Stop Quantity Notes   Alcohol Never --/-- --  --    Tobacco Never --/-- --  --          Immunizations  NameDate Admin Mfg Trade Name Lot Number Route Inj VIS Given VIS Publication   InfluenzaRefused  "09/17/2020 NE Not Entered  NE NE     Comments:          Review of Systems  · Constitutional  o Denies  o : fever, fatigue, weight loss, weight gain  · HENT  o Admits  o : hearing loss, tinnitus  o Denies  o : headaches, vertigo, lightheadedness  · Cardiovascular  o Denies  o : lower extremity edema, claudication, chest pressure, palpitations  · Respiratory  o Denies  o : shortness of breath, wheezing, cough, hemoptysis, dyspnea on exertion  · Gastrointestinal  o Denies  o : nausea, vomiting, diarrhea, constipation, abdominal pain  · Integument  o Admits  o : pigmentation changes, new skin lesions  o Denies  o : rash, itching  · Psychiatric  o Admits  o : anxiety  o Denies  o : depression, suicidal ideation, homicidal ideation      Vitals  Date Time BP Position Site L\R Cuff Size HR RR TEMP (F) WT  HT  BMI kg/m2 BSA m2 O2 Sat FR L/min FiO2 HC       10/14/2020 08:15 /80 Sitting    107 - R  97.6 294lbs 16oz 5'  4\" 50.64 2.46 96 %      10/14/2020 08:18 /80 Sitting                       Physical Examination  · Constitutional  o Appearance  o : well-nourished, well developed, in no acute distress  · Eyes  o Conjunctivae  o : conjunctivae normal, no exudates present  o Sclerae  o : sclerae white  o Pupils and Irises  o : pupils equal and round, and reactive to light and accomodation bilaterally  o Eyelids/Ocular Adnexae  o : extra ocular movements intact  · Ears, Nose, Mouth and Throat  o Ears  o :   § External Ears  § : external auditory canal appearance within normal limits, no discharge present  § Otoscopic Examination  § : tympanic membrane appearance Scarred bilaterally  · Respiratory  o Respiratory Effort  o : breathing unlabored, no accessory muscle use  o Inspection of Chest  o : normal appearance, no retractions  o Auscultation of Lungs  o : normal breath sounds bilaterally  · Cardiovascular  o Heart  o :   § Auscultation of Heart  § : regular rate and rhythm, no murmurs, gallops or rubs  o Peripheral " Vascular System  o :   § Extremities  § : brawny edema present left lower extremity  · Skin and Subcutaneous Tissue  o General Inspection  o : Erythema with scattered superficial open wounds due to her wound condition  · Neurologic  o Mental Status Examination  o :   § Orientation  § : alert and oriented x3  § Speech/Language  § : normal speech pattern  o Gait and Station  o : normal gait, able to stand without difficulty  · Psychiatric  o Judgement and Insight  o : judgment and insight intact, judgement for everyday activities and social situations within normal limits, insight intact  o Thought Processes  o : rate of thoughts normal, thought content logical  o Mood and Affect  o : mood normal, affect appropriate              Assessment  · Anxiety disorder     300.00/F41.9  · Decreased hearing of both ears     389.9/H91.93  · Tinnitus     388.30/H93.19  Will refer over to ENT  · Left leg swelling     729.81/M79.89  · Wound of left lower extremity     891.0/S81.802A  Do wound cultures, Unna boot was placed on patient in office and instructions were given to change in 3 days. Hoping that will help with swelling along with the wound care.  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      Plan  · Orders  o ACO-39: Current medications updated and reviewed (, 1159F) - - 10/14/2020  o ACO-14: Influenza immunization was not administered for reasons documented [facility] () - - 10/14/2020   Declined  o ENT CONSULTATION ([facility]) - 389.9/H91.93, 388.30/H93.19 - 10/14/2020  o Wound Culture with Sensitivities if indicated [facility] ([id number]) - 729.81/M79.89, 891.0/S81.802A - 10/14/2020  o DERMATOLOGY CONSULTATION ([facility]) - 694.4/L10.2 - 10/14/2020   [facility] derm called and said they could see the patient Sept 2021, wants to know if there is any [facility] or [facility] in [place] that could see her  sooner  · Medications  o hydroxyzine HCl 25 mg oral tablet   SIG: take 1 tablet (25 mg) by oral route 3 times per day as needed for 30 days   DISP: (90) Tablet with 1 refills  Prescribed on 10/14/2020     · Instructions  o Take all medications as prescribed/directed.  o Patient was educated/instructed on their diagnosis, treatment and medications prior to discharge from the clinic today.  · Disposition  o Call or Return if symptoms worsen or persist.  o follow up as needed  o call the office with any questions or concerns            Electronically Signed by: Ruiz Bird APRN -Author on October 14, 2020 09:09:56 AM   0

## 2021-05-13 NOTE — PROGRESS NOTES
Progress Note      Patient Name: Will Apodaca   Patient ID: [id number]   Sex: Female   YOB: 1955    Primary Care Provider: Ruiz KHAN   Referring Provider: Ruiz KHAN    Visit Date: June 1, 2020    Provider: Nehemiah Castellanos DO   Location: [facility]   Location Address: [address] [place] [address], [address]  [id number]   Location Phone: [phone]          Chief Complaint  · rash      History Of Present Illness  Will Apodaca is a 64 year old /White female who presents for evaluation and treatment of:      Now.  She does see dermatology for this rash but does report some frustration.  She has been diagnosed with pemphigus Foliaceous.  She is taking CellCept 1500 mg twice a day and taking prednisone.  She is currently taking prednisone 30 mg a day.  She does report leg swelling.  She has been dealing with stasis dermatitis.  She is looking for more holistic options.  I discussed with her using horse [place] extract 300 mg twice a day.  She does have issues with varicose veins as well.  This is has been a chronic issue for her.  She reports the left is worse than the right.  I spoke with her primary, ERIN Ashby.  She is working on referring her to a doctor that does more holistic medicine.  She does have an appointment see dermatology again on Wednesday, in 2 days.  I did write down other treatment options for this condition that could potentially be done to help her out.       Past Medical History  Anxiety; Broken Bones; Hearing loss         Past Surgical History  Correction of right clubfoot         Medication List  betamethasone dipropionate 0.05 % topical ointment; Calcium 500 + D 500 mg(1,250mg) -400 unit oral tablet; CellCept 500 mg oral tablet; fluticasone 50 mcg/actuation nasal spray,suspension; iron 325 mg (65 mg iron) oral tablet; Lasix 20 mg oral tablet; prednisone 10 mg oral tablet; tramadol 50 mg oral tablet; Ventolin HFA 90  "mcg/actuation inhalation HFA aerosol inhaler; Vitamin D2 1,250 mcg (50,000 unit) oral capsule; Vitamin D3 125 mcg (5,000 unit) oral tablet         Allergy List  NO KNOWN DRUG ALLERGIES       Allergies Reconciled  Family Medical History  Stroke; Heart Disease         Social History  Alcohol (Never); Tobacco (Never)         Immunizations  Name Date Admin   Influenza Refused         Review of Systems     Gen: Denies any fever, chills, or weight changes  HEENT: Denies any changes in vision or hearing, denies nasal congestion, denies any neck tenderness or lymphadenopathy  Extremities: Bilateral lower extremity edema  Psychiatric: Denies any changes in mood or affect  Neurologic: Denies any deficits  Skin: Ulceration and blistering of the skin.       Vitals  Date Time BP Position Site L\R Cuff Size HR RR TEMP (F) WT  HT  BMI kg/m2 BSA m2 O2 Sat HC       06/01/2020 10:27 /78 Sitting    117 - R  98.9 278lbs 4oz 5'  4\" 47.76 2.39 97 %          Physical Examination     General: AAO 3, no acute distress, pleasant  HEENT: Normocephalic, atraumatic  Cardiovascular: Regular rate and rhythm without appreciable murmur  Respiratory: Clear to auscultation bilaterally no RRW  Gastrointestinal: Soft nontender nondistended with bowel sounds present  extremities: 1+ pitting edema in the lower extremities bilaterally. Varicose veins noted in the lower extremities.  Neurologic: CN II through XII grossly intact   Psychiatric: Normal mood and affect  Skin: Numerous erythematous areas located in the truncal area and lower extremity as well as her arms.  There are no active blisters but there are ulcerated areas.           Assessment  · Pemphigus foliaceus     694.4/L10.2  · Stasis dermatitis     454.1/I87.2    Problems Reconciled  Plan  · Orders  o ACO-39: Current medications updated and reviewed () - - 06/01/2020  · Medications  o Medications have been Reconciled  o Transition of Care or Provider Policy  · Instructions  o Patient " was educated/instructed on their diagnosis, treatment and medications prior to discharge from the clinic today.  o Patient instructed to seek medical attention urgently for new or worsening symptoms.  o Call the office with any concerns or questions.  o I discussed with patient that she should continue seeing her dermatologist for now. Reviewing records she will be discussing her case in grand rounds and she does have a follow-up coming up soon. I encouraged her to keep this appointment. I did write down other treatment options that could be considered for this. I discussed with her that she would need to be seen by dermatologist before these medications will be prescribed. As far as her stasis dermatitis is concerned she is looking for more holistic/natural approaches so I discussed using horse [place] seed extract that she can take 300 mg twice a day. This can be purchased over-the-counter.  · Disposition  o Follow Up PRN.            Electronically Signed by: Nehemiah Castellanos DO -Author on June 1, 2020 11:09:40 AM

## 2021-05-13 NOTE — PROGRESS NOTES
Progress Note      Patient Name: Will Apodaca   Patient ID: [id number]   Sex: Female   YOB: 1955    Primary Care Provider: Ruiz KHAN   Referring Provider: Ruiz KHAN    Visit Date: October 21, 2020    Provider: ERIN Bello   Location: [facility]   Location Address: [address] [place] [address], [address] [place] [address]  [id number]   Location Phone: [phone]          Chief Complaint  · Left ankle pain      History Of Present Illness  Will Apodaca is a 65 year old /White female who presents for evaluation and treatment of:      Patient is a 65-year-old female who comes in with complaints of left ankle pain.  Patient states that it is painful and swollen.  Patient states she twisted her ankle 2 days ago.  She states she has a history of clubfeet and has had surgery on both feet otherwise no previous ankle injury.  She does have unilateral swelling of the left leg due to her skin condition and is currently using wraps to try to get the swelling down.    When speaking with the patient and on exam she became very short of breath just trying to put her sock on.  We had PFTs done that were normal.  She is currently on Advair inhaler.  She states she gets very short of breath easily but has not taken her inhaler this morning.  She is never seen cardiology in the past.  Blood pressure stable at 142/76.  No recent weight gain.  She is currently on Lasix 20 mg.       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 --          Past Surgical History  Procedure Name Date Notes   Correction of right clubfoot --  --          Medication List  Name Date Started Instructions   Acidophilus oral capsule 10/19/2020 take 1 capsule by oral route 2 times a day for 30 days   Advair -21 mcg/actuation  inhalation HFA aerosol inhaler 08/04/2020 inhale 2 puffs by inhalation route 2 times per day in the morning and evening for 30 days   betamethasone dipropionate 0.05 % topical ointment 08/15/2019 apply a thin layer to the affected area(s) by topical route 2 times per day for 14 days   Cipro 500 mg oral tablet 10/19/2020 take 1 tablet (500 mg) by oral route every 12 hours for 10 days   clindamycin HCl 300 mg oral capsule 10/19/2020 take 1 capsule (300 mg) by oral route 2 times per day for 10 days   cyclobenzaprine 10 mg oral tablet 09/17/2020 take 1 tablet (10 mg) by oral route once daily at bedtime for 90 days   fluticasone propionate 50 mcg/actuation nasal spray,suspension 09/17/2020 spray 1 spray (50 mcg) in each nostril by intranasal route 2 times per day for nasal congestion and allergy symptoms   hydroxyzine HCl 25 mg oral tablet 10/14/2020 take 1 tablet (25 mg) by oral route 3 times per day as needed for 30 days   Iron (ferrous sulfate) 325 mg (65 mg iron) oral tablet 09/17/2020 Take 1 tablet by mouth twice daily   Lasix 20 mg oral tablet 03/13/2020 take 1 tablet (20 mg) by oral route once daily PRN leg swelling   prednisone 10 mg oral tablet  take 1 tablet alternating with 0.5 tablet   tramadol 50 mg oral tablet 01/02/2020 take 1 tablet (50 mg) by oral route every 4 hours as needed for 3 days   Ventolin HFA 90 mcg/actuation inhalation HFA aerosol inhaler  inhale 2 puffs (180 mcg) by inhalation route every 6 hours as needed   Vitamin D3 125 mcg (5,000 unit) oral tablet  take 1 tablet by oral route daily         Allergy List  Allergen Name Date Reaction Notes   NO KNOWN DRUG ALLERGIES --  --  --        Allergies Reconciled  Family Medical History  Disease Name Relative/Age Notes   Stroke Sister/   --    Heart Disease Father/  Mother/  Sister/40   --          Social History  Finding Status Start/Stop Quantity Notes   Alcohol Never --/-- --  --    Tobacco Never --/-- --  --          Immunizations  NameDate Admin  "Mfg Trade Name Lot Number Route Inj VIS Given VIS Publication   InfluenzaRefused 09/17/2020 NE Not Entered  NE NE     Comments:          Review of Systems  · Constitutional  o Denies  o : fever, fatigue, weight loss, weight gain  · Cardiovascular  o Admits  o : lower extremity edema  o Denies  o : claudication, chest pressure, palpitations  · Respiratory  o Admits  o : shortness of breath  o Denies  o : wheezing, cough, hemoptysis, dyspnea on exertion  · Gastrointestinal  o Denies  o : nausea, vomiting, diarrhea, constipation, abdominal pain  · Musculoskeletal  o Admits  o : joint pain, joint swelling, muscle pain  o Denies  o : muscular weakness      Vitals  Date Time BP Position Site L\R Cuff Size HR RR TEMP (F) WT  HT  BMI kg/m2 BSA m2 O2 Sat FR L/min FiO2 HC       10/21/2020 08:08 /76 Sitting    105 - R  96.9 296lbs 2oz 5'  4\" 50.83 2.46 92 %      10/21/2020 08:10 /76 Sitting                       Physical Examination  · Constitutional  o Appearance  o : well-nourished, well developed, in no acute distress  · Eyes  o Conjunctivae  o : conjunctivae normal, no exudates present  o Sclerae  o : sclerae white  o Pupils and Irises  o : pupils equal and round, and reactive to light and accomodation bilaterally  o Eyelids/Ocular Adnexae  o : extra ocular movements intact  · Respiratory  o Respiratory Effort  o : breathing unlabored, no accessory muscle use  o Inspection of Chest  o : normal appearance, no retractions  o Auscultation of Lungs  o : normal breath sounds bilaterally  · Cardiovascular  o Heart  o :   § Auscultation of Heart  § : regular rate and rhythm, no murmurs, gallops or rubs  o Peripheral Vascular System  o :   § Extremities  § : marked lower extremity edema present, left lower extremity, no cyanosis, no distal hair loss, normal capillary refill  · Musculoskeletal  o General  o : left ankle: marked swelling present, no obvious joint abnormality, normal range of motion, no discoloration of " skin  · Neurologic  o Mental Status Examination  o :   § Orientation  § : alert and oriented x3  § Speech/Language  § : normal speech pattern  o Gait and Station  o : normal gait, able to stand without difficulty          Results  · In-Office Procedures  o Medical procedure  § IOP - Nebulizer Treatment (98192)   § Heart Rate Pre-Treatment: 105 bpm  § O2 Saturation Pre-Treatment: 92   § Medication Name: DUo Neb   § Dose: 3 ml   § Heart Rate Post-Treatment: 96 bpm  § O2 Saturation Post-Treatment: 95   § Initials: BI       Assessment  · Dyspnea     786.09/R06.00  Breathing treatment in office of albuterol and Atrovent, patient states she is feeling better will refer over to cardiology to rule out any cardiovascular issues that could be causing her become dyspneic, will give her albuterol inhaler. Discussed return precautions. Patient verbalized understanding.  · Left ankle pain     719.47/M25.572  Will get x-ray of left ankle to rule out any acute bony abnormality.  · Edema     782.3/R60.9  Discussed to continue to wrap the leg will refer over to cardiology.      Plan  · Orders  o ACO-39: Current medications updated and reviewed (1159F, ) - - 10/21/2020  o ACO-14: Influenza immunization was not administered for reasons documented [facility] () - - 10/21/2020  o CARDIOLOGY CONSULTATION (CARDI) - 786.09/R06.00, 782.3/R60.9 - 10/21/2020  o Xray ankle left [facility] ([id number]) - 719.47/M25.572 - 10/21/2020  o Ipratropium bromide (Atrovent) Neb () - - 10/21/2020  o Albuterol Neb. () - - 10/21/2020  · Medications  o albuterol sulfate 90 mcg/actuation inhalation HFA aerosol inhaler   SIG: inhale 2 puffs (180 mcg) by inhalation route every 4-6 hours as needed for 30 days   DISP: (1) Canister with 4 refills  Prescribed on 10/21/2020     · Instructions  o Patient was educated/instructed on their diagnosis, treatment and medications prior to discharge from the clinic today.  · Disposition  o Call or Return  if symptoms worsen or persist.  o follow up as needed  o call the office with any questions or concerns            Electronically Signed by: ERIN Bello -Author on October 21, 2020 09:56:25 AM

## 2021-05-14 VITALS
SYSTOLIC BLOOD PRESSURE: 146 MMHG | HEIGHT: 64 IN | OXYGEN SATURATION: 96 % | HEART RATE: 107 BPM | BODY MASS INDEX: 50.02 KG/M2 | TEMPERATURE: 97.6 F | WEIGHT: 293 LBS | DIASTOLIC BLOOD PRESSURE: 80 MMHG

## 2021-05-14 VITALS
OXYGEN SATURATION: 98 % | BODY MASS INDEX: 50.02 KG/M2 | HEART RATE: 104 BPM | SYSTOLIC BLOOD PRESSURE: 140 MMHG | WEIGHT: 293 LBS | DIASTOLIC BLOOD PRESSURE: 82 MMHG | TEMPERATURE: 97.6 F | HEIGHT: 64 IN

## 2021-05-14 VITALS
HEART RATE: 110 BPM | SYSTOLIC BLOOD PRESSURE: 130 MMHG | OXYGEN SATURATION: 94 % | HEIGHT: 64 IN | WEIGHT: 293 LBS | BODY MASS INDEX: 50.02 KG/M2 | DIASTOLIC BLOOD PRESSURE: 72 MMHG | TEMPERATURE: 98.1 F

## 2021-05-14 VITALS
DIASTOLIC BLOOD PRESSURE: 82 MMHG | HEART RATE: 104 BPM | HEIGHT: 64 IN | WEIGHT: 285 LBS | OXYGEN SATURATION: 97 % | BODY MASS INDEX: 48.65 KG/M2 | SYSTOLIC BLOOD PRESSURE: 132 MMHG

## 2021-05-14 VITALS
BODY MASS INDEX: 50.02 KG/M2 | HEIGHT: 64 IN | OXYGEN SATURATION: 94 % | HEART RATE: 101 BPM | WEIGHT: 293 LBS | TEMPERATURE: 97.5 F

## 2021-05-14 VITALS
OXYGEN SATURATION: 100 % | TEMPERATURE: 96.9 F | WEIGHT: 293 LBS | BODY MASS INDEX: 50.02 KG/M2 | HEART RATE: 97 BPM | HEIGHT: 64 IN | SYSTOLIC BLOOD PRESSURE: 136 MMHG | DIASTOLIC BLOOD PRESSURE: 84 MMHG

## 2021-05-14 VITALS
SYSTOLIC BLOOD PRESSURE: 134 MMHG | HEIGHT: 64 IN | DIASTOLIC BLOOD PRESSURE: 78 MMHG | BODY MASS INDEX: 50.02 KG/M2 | HEART RATE: 96 BPM | WEIGHT: 293 LBS

## 2021-05-14 VITALS — SYSTOLIC BLOOD PRESSURE: 116 MMHG | HEART RATE: 110 BPM | HEIGHT: 64 IN | DIASTOLIC BLOOD PRESSURE: 70 MMHG

## 2021-05-14 VITALS
OXYGEN SATURATION: 96 % | DIASTOLIC BLOOD PRESSURE: 90 MMHG | HEART RATE: 105 BPM | WEIGHT: 287.37 LBS | TEMPERATURE: 98.1 F | HEIGHT: 64 IN | BODY MASS INDEX: 49.06 KG/M2 | SYSTOLIC BLOOD PRESSURE: 162 MMHG

## 2021-05-14 VITALS — HEIGHT: 64 IN | WEIGHT: 293 LBS | BODY MASS INDEX: 50.02 KG/M2 | TEMPERATURE: 97.5 F

## 2021-05-14 VITALS
HEIGHT: 64 IN | DIASTOLIC BLOOD PRESSURE: 92 MMHG | BODY MASS INDEX: 50.02 KG/M2 | TEMPERATURE: 97.5 F | HEART RATE: 105 BPM | OXYGEN SATURATION: 97 % | SYSTOLIC BLOOD PRESSURE: 142 MMHG | WEIGHT: 293 LBS

## 2021-05-14 VITALS
HEART RATE: 105 BPM | DIASTOLIC BLOOD PRESSURE: 76 MMHG | TEMPERATURE: 96.9 F | BODY MASS INDEX: 50.02 KG/M2 | WEIGHT: 293 LBS | HEIGHT: 64 IN | OXYGEN SATURATION: 92 % | SYSTOLIC BLOOD PRESSURE: 142 MMHG

## 2021-05-14 NOTE — PROGRESS NOTES
Progress Note      Patient Name: Will Apodaca   Patient ID: [id number]   Sex: Female   YOB: 1955    Primary Care Provider: Ruiz KHAN   Referring Provider: Ruiz KHAN    Visit Date: January 15, 2021    Provider: ERIN Bello   Location: [facility]   Location Address: [address] [place] [address], [address]  [id number]   Location Phone: [phone]          Chief Complaint  · Follow up office visit within 14 calendar days of discharge from inpatient status (moderate complexity).      History Of Present Illness  Will Apodaca is a 65 year old /White female who presents for evaluation and treatment of:   FOLLOW UP OFFICE VISIT WITHIN 14 CALENDAR DAYS OF INPATIENT STATUS (MODERATE COMPLEXITY)  Will Apodaca presents to office for follow up post discharge from inpatient status within 14 calendar days. Patient was contacted within 2 business days via phone conversation. Documentation of that phone contact is present in the patient's electronic chart. Patient was admitted to inpatient facility on 01/03/2021 and discharged 01/08/2021 due to: cellulitis.   Admitting MD: Mariama Sotelo MD   Her discharge summary has been reviewed and placed in the patient's electronic chart.   Patient's problem list is: Anxiety, Hearing loss, Hypercalcemia, Pemphigus foliaceous, and Tinnitus   Patient's outpatient medication list has been reconcilled with the medication list from the discharge summary and has been reviewed with the patient. Current medication list is: Acidophilus oral capsule, Advair -21 mcg/actuation inhalation HFA aerosol inhaler, albuterol sulfate 90 mcg/actuation inhalation HFA aerosol inhaler, ascorbic acid (vitamin C) 250 mg oral tablet, aspirin 81 mg oral tablet,delayed release (DR/EC), Augmentin 875-125 mg oral tablet, betamethasone dipropionate 0.05 % topical ointment, cyclobenzaprine 10 mg oral tablet, ferrous sulfate 325  mg (65 mg iron) oral tablet, fluticasone propionate 50 mcg/actuation nasal spray,suspension, Fosamax 70 mg oral tablet, hydroxyzine HCl 25 mg oral tablet, Lasix 40 mg oral tablet, methotrexate sodium 2.5 mg oral tablet, miconazole nitrate 2 % topical aerosol powder, Norco 5-325 mg oral tablet, nystatin topical, prednisone 20 mg oral tablet, Tessalon Perles 100 mg oral capsule, tramadol 50 mg oral tablet, Ventolin HFA 90 mcg/actuation inhalation HFA aerosol inhaler, Vitamin D3 125 mcg (5,000 unit) oral tablet, and zinc 50 mg oral tablet      Patient is a 65-year-old female whom I spoke with Patient is a 65-year-old female seen status post inpatient hospitalization for cellulitis bilateral lower extremities.  Patient has a chronic skin called pemphigus foliaceous.  This is causing her skin to slough off around eyes, lips and causing recurring issues with cellulitis in her bilateral lower extremities.  She has been on high-dose steroids for almost a year, she has an appointment upcoming with dermatology 1/28/2021.  The skin condition caused a significant amount of pain due to the sloughing of the skin.  She has been using prescribed cream, she is currently on 40 mg of prednisone until she sees dermatology 1/28/2021.  She stills feels like her skin condition is not improving, she does state that she feels like her legs are getting better with the cellulitis where she still is currently on antibiotics.  She is going to do Unna boots when she gets home to help with the soothing of her legs.       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 --    Tinnitus --  --          Past Surgical History  Procedure Name Date Notes   Correction of right clubfoot --  --          Medication List  Name Date Started Instructions   Acidophilus oral capsule 12/30/2020 take 1 capsule by  oral route 2 times a day for 30 days   Advair -21 mcg/actuation inhalation HFA aerosol inhaler 01/15/2021 inhale 2 puffs by inhalation route 2 times per day in the morning and evening for 30 days   albuterol sulfate 90 mcg/actuation inhalation HFA aerosol inhaler 10/21/2020 inhale 2 puffs (180 mcg) by inhalation route every 4-6 hours as needed for 30 days   ascorbic acid (vitamin C) 250 mg oral tablet  take 2 tablets by oral route QD with iron   aspirin 81 mg oral tablet,delayed release (DR/EC)  take 1 tablet (81 mg) by oral route once daily for 30 days   Augmentin 875-125 mg oral tablet  take 1 tablet by oral route every 12 hours for 14 days   betamethasone dipropionate 0.05 % topical ointment 08/15/2019 apply a thin layer to the affected area(s) by topical route 2 times per day for 14 days   cyclobenzaprine 10 mg oral tablet 01/15/2021 take 1 tablet (10 mg) by oral route once daily at bedtime for 90 days   ferrous sulfate 325 mg (65 mg iron) oral tablet  take 1 tablet (325 mg) by oral route once daily   fluticasone propionate 50 mcg/actuation nasal spray,suspension 01/15/2021 spray 1 spray (50 mcg) in each nostril by intranasal route 2 times per day for nasal congestion and allergy symptoms   Fosamax 70 mg oral tablet  take 1 tablet (70 mg) by oral route once weekly in the morning, at least 30 min before first food, beverage, or medication of day   hydroxyzine HCl 25 mg oral tablet 01/15/2021 take 1 tablet (25 mg) by oral route 3 times per day as needed for 30 days   Lasix 40 mg oral tablet  take 1 tablet (40 mg) by oral route 2 times per day   methotrexate sodium 2.5 mg oral tablet  take 2 tablets (5 mg) by oral route once weekly starting 1-13   miconazole nitrate 2 % topical aerosol powder  apply spray to the affected area(s) by topical route 2 times per day in the morning and evening   Norco 5-325 mg oral tablet  take 1 tablet by oral route every 4 hours as needed for pain   nystatin topical  apply BID  as needed   prednisone 20 mg oral tablet  take 2 tablets (40 mg) by oral route once daily   Tessalon Perles 100 mg oral capsule  take 2 capsules (200 mg) by oral route 3 times per day as needed for cough   tramadol 50 mg oral tablet 01/15/2021 take 1 tablet (50 mg) by oral route every 4 hours as needed for 30 days   Ventolin HFA 90 mcg/actuation inhalation HFA aerosol inhaler  inhale 2 puffs (180 mcg) by inhalation route every 6 hours as needed   Vitamin D3 125 mcg (5,000 unit) oral tablet  take 1 tablet by oral route daily   zinc 50 mg oral tablet  take 1 tablet by oral route daily         Allergy List  Allergen Name Date Reaction Notes   NO KNOWN DRUG ALLERGIES --  --  --          Family Medical History  Disease Name Relative/Age Notes   Family history of stroke Sister/40   --    Family history of heart disease Father/  Mother/  Sister/40   --          Social History  Finding Status Start/Stop Quantity Notes   Alcohol Never --/-- --  --    Tobacco Never --/-- --  --          Immunizations  NameDate Admin Mfg Trade Name Lot Number Route Inj VIS Given VIS Publication   InfluenzaRefused 09/17/2020 NE Not Entered  NE NE     Comments:          Review of Systems  · Constitutional  o Denies  o : fever, fatigue, weight loss, weight gain  · HENT  o Denies  o : headaches, vertigo, lightheadedness  · Cardiovascular  o Admits  o : lower extremity edema  o Denies  o : claudication, chest pressure, palpitations  · Respiratory  o Denies  o : shortness of breath, wheezing, cough, hemoptysis, dyspnea on exertion  · Gastrointestinal  o Denies  o : nausea, vomiting, diarrhea, constipation, abdominal pain  · Integument  o Admits  o : rash, pigmentation changes, skin dryness  · Musculoskeletal  o Admits  o : muscle pain  o Denies  o : joint pain, muscular weakness      Vitals  Date Time BP Position Site L\R Cuff Size HR RR TEMP (F) WT  HT  BMI kg/m2 BSA m2 O2 Sat FR L/min FiO2 HC       01/15/2021 02:21 PM      101 - R  97.5 296lbs  "16oz 5'  4\" 50.98 2.47 94 %            Physical Examination  · Constitutional  o Appearance  o : well-nourished, well developed, in no acute distress  · Eyes  o Conjunctivae  o : conjunctivae normal, no exudates present  o Sclerae  o : sclerae white  o Pupils and Irises  o : pupils equal and round, and reactive to light and accomodation bilaterally  o Eyelids/Ocular Adnexae  o : extra ocular movements intact  · Respiratory  o Respiratory Effort  o : breathing unlabored, no accessory muscle use  o Inspection of Chest  o : normal appearance, no retractions  o Auscultation of Lungs  o : normal breath sounds bilaterally  · Cardiovascular  o Heart  o :   § Auscultation of Heart  § : regular rate and rhythm, no murmurs, gallops or rubs  o Peripheral Vascular System  o :   § Extremities  § : no edema  · Skin and Subcutaneous Tissue  o General Inspection  o : Open skin excoriated areas around eyes, lips, neck, I will axillary region, painful, not improving  · Neurologic  o Mental Status Examination  o :   § Orientation  § : alert and oriented x3  § Speech/Language  § : normal speech pattern  o Gait and Station  o : normal gait, able to stand without difficulty          Results  · In-Office Procedures  o Lab procedure  § IOP - Urine Drug Screen In-House [facility] ([address])   § Amphetamines Ur Ql: Negative   § Barbiturates Ur Ql: Negative   § Buprenorphine+Nor Ur Ql Scn: Negative   § Benzodiaz Ur Ql: Negative   § Cocaine Ur Ql: Negative   § Methadone Ur Ql: Negative   § Methamphet Ur Ql: Negative   § MDMA Ur Ql Scn: Negative   § Opiates Ur Ql: Negative   § Oxycodone Ur Ql: Negative   § PCP Ur Ql: Negative   § THC Ur Ql: Negative   § Temp in Range?: Within/Acceptable   § Control Seen?: Yes       Assessment  · Pemphigus foliaceous     694.4/L10.2      Plan  · Orders  o Discharge medications reconciled with the current medication list (1111F) - - 01/15/2021  o ACO-14: Influenza immunization was not administered for reasons documented " [facility] () - - 01/15/2021   pt declines  o ACO-39: Current medications updated and reviewed (, 1159F) - - 01/15/2021  · Medications  o Advair -21 mcg/actuation inhalation HFA aerosol inhaler   SIG: inhale 2 puffs by inhalation route 2 times per day in the morning and evening for 30 days   DISP: (1) Canister with 5 refills  Adjusted on 01/15/2021     o cyclobenzaprine 10 mg oral tablet   SIG: take 1 tablet (10 mg) by oral route once daily at bedtime for 90 days   DISP: (90) Tablet with 1 refills  Adjusted on 01/15/2021     o fluticasone propionate 50 mcg/actuation nasal spray,suspension   SIG: spray 1 spray (50 mcg) in each nostril by intranasal route 2 times per day for nasal congestion and allergy symptoms   DISP: (1) Bottle with 5 refills  Adjusted on 01/15/2021     o hydroxyzine HCl 25 mg oral tablet   SIG: take 1 tablet (25 mg) by oral route 3 times per day as needed for 30 days   DISP: (90) Tablet with 1 refills  Adjusted on 01/15/2021     o tramadol 50 mg oral tablet   SIG: take 1 tablet (50 mg) by oral route every 4 hours as needed for 30 days   DISP: (60) Tablet with 1 refills  Adjusted on 01/15/2021     o Medications have been Reconciled  o Transition of Care or Provider Policy  · Instructions  o Take all medications as prescribed/directed.  o Patient was educated/instructed on their diagnosis, treatment and medications prior to discharge from the clinic today.  o Call the office with any concerns or questions.  o Patient discharge summary has been reviewed and placed in patient's electronic medical record.  o Patient received a phone call from my office within 2 business days of discharge from inpatient status, and documented within the patient's chart.  o Also patient was seen (face to face) for follow up evaluation within 14 calendar days of discharge from inpatient status for a severe complexity issue.  o Patient was educated on their diagnosis, treatment and any medication changes while  being evaluated today.  · Disposition  o Call or Return if symptoms worsen or persist.  o follow up as needed  o call the office with any questions or concerns            Electronically Signed by: ERIN Bello -Author on January 15, 2021 03:44:23 PM

## 2021-05-14 NOTE — PROGRESS NOTES
Progress Note      Patient Name: Will Apodaca   Patient ID: [id number]   Sex: Female   YOB: 1955    Primary Care Provider: Ruiz KHAN   Referring Provider: Ruiz KHAN    Visit Date: December 30, 2020    Provider: ERIN Bello   Location: [facility]   Location Address: [address] [place] [address], [address] [place] [address]  [id number]   Location Phone: [phone]          Chief Complaint  · bilateral leg pain/swelling  · bilateral ear drainage  · Unsteady gait      History Of Present Illness  Will Apodaca is a 65 year old /White female who presents for evaluation and treatment of:      Patient is a 65-year-old female who has had a chronic skin condition causing inflammation systemically wide bilateral leg pain and swelling, ulcers, sores on swelling on her face and bilateral arms.  She was seen at [facility] dermatology at [facility] on steroids for over a year, she states she just cannot tolerate them any longer the got her on CellCept and she is having side effects from it.  Patient states that she just feels like she is not being listened to and would like a second opinion to different dermatologist.    Patient states her bilateral lower extremities increased swelling and pain, she is having trouble walking secondary to the pain.  She states is been ongoing for the past 3 to 4 days.  She has been using Unna boots at home that has helped with the sores but now she has got severe redness in bilateral lower extremities with increase in pain.    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    Patient is having increased shortness of breath worse with exertion.  She is currently on 80 mg of Lasix, she is got a dry cough, she is on Advair and albuterol and has been taking them as directed.,   This is chronic but getting worse, patient is unable to walk long distances, she is needing help with assistance in ambulation, niece is with patient at bedside and they are inquiring about a Rollator walker with seat to help patient ambulate and prevent falls.       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 --    Tinnitus --  --          Past Surgical History  Procedure Name Date Notes   Correction of right clubfoot --  --          Medication List  Name Date Started Instructions   Acidophilus oral capsule 12/30/2020 take 1 capsule by oral route 2 times a day for 30 days   Advair -21 mcg/actuation inhalation HFA aerosol inhaler 08/04/2020 inhale 2 puffs by inhalation route 2 times per day in the morning and evening for 30 days   albuterol sulfate 90 mcg/actuation inhalation HFA aerosol inhaler 10/21/2020 inhale 2 puffs (180 mcg) by inhalation route every 4-6 hours as needed for 30 days   betamethasone dipropionate 0.05 % topical ointment 08/15/2019 apply a thin layer to the affected area(s) by topical route 2 times per day for 14 days   cyclobenzaprine 10 mg oral tablet 09/17/2020 take 1 tablet (10 mg) by oral route once daily at bedtime for 90 days   fluticasone propionate 50 mcg/actuation nasal spray,suspension 09/17/2020 spray 1 spray (50 mcg) in each nostril by intranasal route 2 times per day for nasal congestion and allergy symptoms   hydroxyzine HCl 25 mg oral tablet 10/14/2020 take 1 tablet (25 mg) by oral route 3 times per day as needed for 30 days   Iron (ferrous sulfate) 325 mg (65 mg iron) oral tablet 09/17/2020 Take 1 tablet by mouth twice daily   Lasix 80 mg oral tablet  take 1 tablet (80 mg) by oral route once daily   prednisone 20 mg oral tablet  take 1 tablet (20 mg) by oral route once daily   tramadol 50 mg oral tablet 01/02/2020  "take 1 tablet (50 mg) by oral route every 4 hours as needed for 3 days   Ventolin HFA 90 mcg/actuation inhalation HFA aerosol inhaler  inhale 2 puffs (180 mcg) by inhalation route every 6 hours as needed   Vitamin D3 125 mcg (5,000 unit) oral tablet  take 1 tablet by oral route daily         Allergy List  Allergen Name Date Reaction Notes   NO KNOWN DRUG ALLERGIES --  --  --          Family Medical History  Disease Name Relative/Age Notes   Family history of stroke Sister/40   --    Family history of heart disease Father/  Mother/  Sister/40   --          Social History  Finding Status Start/Stop Quantity Notes   Alcohol Never --/-- --  --    Tobacco Never --/-- --  --          Immunizations  NameDate Admin Mfg Trade Name Lot Number Route Inj VIS Given VIS Publication   InfluenzaRefused 09/17/2020 NE Not Entered  NE NE     Comments:          Review of Systems  · Constitutional  o Denies  o : fever, fatigue, weight loss, weight gain  · HENT  o Admits  o : hearing loss, ear fullness  o Denies  o : headaches, vertigo, ear pain  · Cardiovascular  o Admits  o : lower extremity edema  o Denies  o : claudication, chest pressure, palpitations  · Respiratory  o Admits  o : shortness of breath, dyspnea on exertion  o Denies  o : wheezing, cough, hemoptysis  · Gastrointestinal  o Denies  o : nausea, vomiting, diarrhea, constipation, abdominal pain  · Integument  o Admits  o : pigmentation changes, new skin lesions  o Denies  o : rash, itching  · Neurologic  o Admits  o : muscular weakness, loss of balance  o Denies  o : altered mental status, incoordination  · Musculoskeletal  o Admits  o : muscle pain  o Denies  o : joint pain, joint swelling      Vitals  Date Time BP Position Site L\R Cuff Size HR RR TEMP (F) WT  HT  BMI kg/m2 BSA m2 O2 Sat FR L/min FiO2        12/30/2020 11:09 /92 Sitting    105 - R  97.5 301lbs 2oz 5'  4\" 51.69 2.48 97 %            Physical Examination  · Constitutional  o Appearance  o : " well-nourished, well developed, in no acute distress  · Eyes  o Conjunctivae  o : conjunctivae normal, no exudates present  o Sclerae  o : sclerae white  o Pupils and Irises  o : pupils equal and round, and reactive to light and accomodation bilaterally  o Eyelids/Ocular Adnexae  o : extra ocular movements intact  · Ears, Nose, Mouth and Throat  o Ears  o :   § External Ears  § : external auditory canal appearance within normal limits, no discharge present  § Otoscopic Examination  § : Severe scarring bilateral TMs, no cerumen impaction  · Respiratory  o Respiratory Effort  o : breathing unlabored, no accessory muscle use  o Inspection of Chest  o : normal appearance, no retractions  o Auscultation of Lungs  o : normal breath sounds bilaterally  · Cardiovascular  o Heart  o :   § Auscultation of Heart  § : regular rate and rhythm, no murmurs, gallops or rubs  o Peripheral Vascular System  o :   § Extremities  § : brawny edema present, no cyanosis, generalized distal extremity hair loss present, normal capillary refill, erythema bilateral lower extremities consistent with cellulitis this, venous stasis ulcers bilateral lower extremities, healing, chronic  · Neurologic  o Mental Status Examination  o :   § Orientation  § : alert and oriented x3  § Speech/Language  § : normal speech pattern  o Gait and Station  o : normal gait, able to stand without difficulty              Assessment  · 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  · Hearing loss     389.9/H91.90  wears hearing aids  · Decreased hearing     389.9/H91.90  · SOB (shortness of breath) on exertion     786.05/R06.02  Will do chest x-ray and BNP to rule out fluid overload  · Lower extremity edema     782.3/R60.0  · Unsteady gait     781.2/R26.81  Will prescribe Rollator with seat to help with ambulation  · Cellulitis     682.9/L03.90  Will prescribe  clindamycin along with a probiotic      Plan  · Orders  o ACO-14: Influenza immunization was not administered for reasons documented [facility] () - - 12/30/2020   pt declines  o Chest X-Ray 2 views (In-Office) ([id number]) - 786.05/R06.02 - 12/30/2020  o BNP (brain natriuretic peptide measurement) (70636) - 786.05/R06.02, 782.3/R60.0 - 12/30/2020  · Medications  o clindamycin HCl 300 mg oral capsule   SIG: take 1 capsule (300 mg) by oral route 2 times per day for 10 days   DISP: (20) Capsule with 0 refills  Prescribed on 12/30/2020     o Acidophilus oral capsule   SIG: take 1 capsule by oral route 2 times a day for 30 days   DISP: (60) Capsule with 0 refills  Adjusted on 12/30/2020     o Medications have been Reconciled  o Transition of Care or Provider Policy  · Instructions  o Patient was educated/instructed on their diagnosis, treatment and medications prior to discharge from the clinic today.            Electronically Signed by: ERIN Bello -Author on December 30, 2020 11:53:35 AM

## 2021-05-14 NOTE — PROGRESS NOTES
Progress Note      Patient Name: Will Apodaca   Patient ID: [id number]   Sex: Female   YOB: 1955    Primary Care Provider: Ruiz KHAN   Referring Provider: Ruiz KHAN    Visit Date: March 31, 2021    Provider: ERIN Bello   Location: [facility]   Location Address: [address] [place] [address], [address]  [id number]   Location Phone: [phone]          Chief Complaint  · Follow up office visit within 14 calendar days of discharge from inpatient status (moderate complexity).  · The patient requires the head of the bed to be elevated more than 30 degrees the majority of the time due to oxygen dependency, congestive heart failure and chronic pulmonary issues.      History Of Present Illness  Will Apodaca is a 65 year old /White female who presents for evaluation and treatment of:   FOLLOW UP OFFICE VISIT WITHIN 14 CALENDAR DAYS OF INPATIENT STATUS (MODERATE COMPLEXITY)  Will Apodaca presents to office for follow up post discharge from inpatient status within 14 calendar days. Patient was contacted within 2 business days via phone conversation. Documentation of that phone contact is present in the patient's electronic chart. Patient was admitted to inpatient facility on 03/15/2021 and discharged 03/23/2021 due to: PE/DVT.   Admitting MD: Gwyn Tovar DO   Her discharge summary has been reviewed and placed in the patient's electronic chart.   Patient's problem list is: Anxiety, Hearing loss, Hypercalcemia, Pemphigus foliaceous, and Tinnitus   Patient's outpatient medication list has been reconcilled with the medication list from the discharge summary and has been reviewed with the patient. Current medication list is: Acidophilus oral capsule, Advair -21 mcg/actuation inhalation HFA aerosol inhaler, albuterol sulfate 90 mcg/actuation inhalation HFA aerosol inhaler, alendronate 70 mg oral tablet, amoxicillin 875 mg oral  tablet, ascorbic acid (vitamin C) 250 mg oral tablet, aspirin 81 mg oral tablet,delayed release (DR/EC), Bactrim -160 mg oral tablet, betamethasone dipropionate 0.05 % topical ointment, cefdinir 300 mg oral capsule, cholecalciferol (vitamin D3) 10 mcg (400 unit) oral tablet, cyclobenzaprine 10 mg oral tablet, ferrous sulfate 325 mg (65 mg iron) oral tablet, fluticasone propionate 50 mcg/actuation nasal spray,suspension, folic acid 0.8 mg oral capsule, folic acid 1 mg oral tablet, Fosamax 70 mg oral tablet, gentamicin 0.1 % topical ointment, hydroxyzine HCl 25 mg oral tablet, insulin lispro 100 unit/mL subcutaneous insulin pen, Lantus U-100 Insulin 100 unit/mL subcutaneous solution, methotrexate sodium 2.5 mg oral tablet, miconazole nitrate 2 % topical aerosol powder, mupirocin 2 % topical ointment, Norco 5-325 mg oral tablet, pantoprazole 40 mg oral tablet,delayed release (DR/EC), prednisone 50 mg oral tablet, sulfamethoxazole-trimethoprim 200-40 mg/5 mL oral suspension, Tessalon Perles 100 mg oral capsule, tramadol 50 mg oral tablet, Ventolin HFA 90 mcg/actuation inhalation HFA aerosol inhaler, Xanax 0.25 mg oral tablet, and zinc 50 mg oral tablet      Patient is a 65-year-old female who was admitted inpatient for saddle PE, DVT, hypoxemia at [facility].  Patient is coming in for her IPF. On her discharge summary it says the patient to stop taking Lasix.  Patient was taken 40 kg of Lasix twice a day.  She is now complaining significant swelling bilateral lower extremities.  She is currently now on 4 L of oxygen and is oxygen dependent at this time.  She was not given pulmonology to follow-up with.  Patient was not on oxygen previously to hospitalization.  She states other than the swelling of her legs and feet she is feeling better her shortness of breath has improved.    Patient is unable to lay flat due to orthopnea and oxygen dependency would benefit from a hospital bed.  She also is in need of a  bariatric shower chair and bariatric portable bedside commode.  Patient was not set up with home health, they had discussed it upon discharge but nothing was ever done.  She has fatigue and does have trouble ambulating due to her legs giving out on her.  She states her niece pushes her on her Rollator walker.    Due to patient's congestive heart failure and oxygen dependency she is in need of a hospital bed she requires the head of the bed to be elevated more than 30 degrees majority of the time due to her congestive heart failure, and chronic pulmonary disease.       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 --    Tinnitus --  --          Past Surgical History  Procedure Name Date Notes   Correction of right clubfoot --  --          Medication List  Name Date Started Instructions   Acidophilus oral capsule 03/31/2021 take 1 capsule by oral route 2 times a day for 90 days   Advair -21 mcg/actuation inhalation HFA aerosol inhaler 03/31/2021 inhale 2 puffs by inhalation route 2 times per day in the morning and evening for 30 days   albuterol sulfate 90 mcg/actuation inhalation HFA aerosol inhaler 03/31/2021 inhale 2 puffs (180 mcg) by inhalation route every 4-6 hours as needed for 30 days   alendronate 70 mg oral tablet  take 1 tablet (70 mg) by oral route once weekly in the morning, at least 30 min before first food, beverage, or medication of day   ascorbic acid (vitamin C) 250 mg oral tablet 03/31/2021 take 2 tablets by oral route QD with iron   aspirin 81 mg oral tablet,delayed release (DR/EC) 03/31/2021 take 1 tablet (81 mg) by oral route once daily for 90 days   betamethasone dipropionate 0.05 % topical ointment 08/15/2019 apply a thin layer to the affected area(s) by topical route 2 times per day for 14 days   cefdinir 300 mg oral capsule  take 1  capsule (300 mg) by oral route every 12 hours for 14 days   cholecalciferol (vitamin D3) 10 mcg (400 unit) oral tablet  take 1 tablet by oral route daily   cyclobenzaprine 10 mg oral tablet 01/15/2021 take 1 tablet (10 mg) by oral route once daily at bedtime for 90 days   ferrous sulfate 325 mg (65 mg iron) oral tablet 03/31/2021 take 1 tablet (325 mg) by oral route once daily for 90 days   fluticasone propionate 50 mcg/actuation nasal spray,suspension 01/15/2021 spray 1 spray (50 mcg) in each nostril by intranasal route 2 times per day for nasal congestion and allergy symptoms   folic acid 0.8 mg oral capsule 03/31/2021 take 1 capsule by oral route daily for 90 days   folic acid 1 mg oral tablet 03/31/2021 take 1 tablet (1 mg) by oral route once daily for 90 days   Fosamax 70 mg oral tablet 03/31/2021 take 1 tablet (70 mg) PO weekly in the morning, at least 30 min before first food, beverage, or medication of day   gentamicin 0.1 % topical ointment  apply a small amount to the affected area by topical route 2 times per day   hydroxyzine HCl 25 mg oral tablet 03/31/2021 take 1 tablet (25 mg) by oral route 3 times per day as needed for 30 days   insulin lispro 100 unit/mL subcutaneous insulin pen  inject by subcutaneous route 3 units with meals   Lantus U-100 Insulin 100 unit/mL subcutaneous solution  inject 9 units by subcutaneous route daily   methotrexate sodium 2.5 mg oral tablet 03/31/2021 take 4 tablets (10 mg) by oral route once weekly   miconazole nitrate 2 % topical aerosol powder  apply spray to the affected area(s) by topical route 2 times per day in the morning and evening   mupirocin 2 % topical ointment  apply a small amount to the affected area by topical route 2 times per day   pantoprazole 40 mg oral tablet,delayed release (DR/EC) 03/31/2021 take 1 tablet (40 mg) by oral route once daily for 90 days   prednisone 50 mg oral tablet  take 2 tablets (100 mg) by oral route once daily   tramadol 50 mg oral  tablet 03/31/2021 take 1 tablet (50 mg) by oral route every 4 hours as needed for 30 days   Ventolin HFA 90 mcg/actuation inhalation HFA aerosol inhaler  inhale 2 puffs (180 mcg) by inhalation route every 6 hours as needed   Xanax 0.25 mg oral tablet 03/31/2021 take 1 tablet by oral route 2 times a day for 30 days   zinc 50 mg oral tablet  take 1 tablet by oral route daily         Allergy List  Allergen Name Date Reaction Notes   NO KNOWN DRUG ALLERGIES --  --  --        Allergies Reconciled  Family Medical History  Disease Name Relative/Age Notes   Family history of stroke Sister/40   --    Family history of heart disease Father/  Mother/  Sister/40   --          Social History  Finding Status Start/Stop Quantity Notes   Alcohol Never --/-- --  --    Tobacco Never --/-- --  --          Immunizations  NameDate Admin Mfg Trade Name Lot Number Route Inj VIS Given VIS Publication   InfluenzaRefused 09/17/2020 NE Not Entered  NE NE     Comments:          Review of Systems  · Constitutional  o Denies  o : fever, fatigue, weight loss, weight gain  · Eyes  o Denies  o : impaired vision, blurred vision, changes in vision  · HENT  o Denies  o : headaches, vertigo, lightheadedness  · Cardiovascular  o Admits  o : lower extremity edema  o Denies  o : claudication, chest pressure, palpitations  · Respiratory  o Admits  o : shortness of breath, exertional dyspnea, oxygen dependent  o Denies  o : cough, productive cough  · Gastrointestinal  o Denies  o : nausea, vomiting, diarrhea, constipation, abdominal pain  · Integument  o Admits  o : rash, pigmentation changes (chronic)  o Denies  o : skin dryness, new skin lesions  · Neurologic  o Admits  o : muscular weakness, memory difficulties, loss of balance  o Denies  o : altered mental status, speech difficulties  · Musculoskeletal  o Admits  o : muscle pain, muscular weakness  · Psychiatric  o Admits  o : anxiety  o Denies  o : depression, suicidal ideation, homicidal  "ideation      Vitals  Date Time BP Position Site L\R Cuff Size HR RR TEMP (F) WT  HT  BMI kg/m2 BSA m2 O2 Sat FR L/min FiO2 HC       03/31/2021 02:14 /82 Sitting    104 - R  97.6 296lbs 16oz 5'  4\" 50.98 2.47 98 %            Physical Examination  · Constitutional  o Appearance  o : obese, well developed, alert, in some distress, well-tended appearance, appears chronically ill   · Eyes  o Conjunctivae  o : conjunctivae normal, no exudates present  o Sclerae  o : sclerae white  o Pupils and Irises  o : pupils equal and round, and reactive to light and accomodation bilaterally  o Eyelids/Ocular Adnexae  o : extra ocular movements intact  · Respiratory  o Respiratory Effort  o : breathing unlabored, no accessory muscle use  o Inspection of Chest  o : normal appearance, no retractions  o Auscultation of Lungs  o : diminished breath sounds present    · Cardiovascular  o Heart  o :   § Auscultation of Heart  § : regular rate and rhythm, no murmurs, gallops or rubs  o Peripheral Vascular System  o :   § Extremities  § : 4+ edema present, marked cyanosis present, distal hair loss present in legs, normal capillary refill  · Skin and Subcutaneous Tissue  o General Inspection  o : rash present, (chronic) due to pemphigus Foliaceous   · Neurologic  o Mental Status Examination  o :   § Orientation  § : alert and oriented x3  § Speech/Language  § : normal speech pattern  o Gait and Station  o : unstable gait, unsteady when standing   · Psychiatric  o Judgement and Insight  o : judgment and insight intact, judgement for everyday activities and social situations within normal limits, insight intact  o Thought Processes  o : rate of thoughts normal, thought content logical  o Mood and Affect  o : anxiety, affect appropriate              Assessment  · CHF (congestive heart failure)     428.0/I50.9  · Insomnia, unspecified     780.52/G47.00  · Oxygen dependent     V46.2/Z99.81  Due to her breathing issues patient is unable to lay " in bed flat, she has been sleeping on recliner, would benefit from a hospital bed. We will get in touch with our  to set up to get a hospital bed.  · History of pulmonary embolism     V12.55/Z86.711  Patient is now on Eliquis, she was discharged home, no follow-ups were given for her. Due to her saddle PE and congestive heart failure she is now oxygen dependent. Will refer over to pulmonology, discussed with patient to get an appointment with cardiology. Patient  · Dyspnea     786.09/R06.00  · Limited mobility     V49.89/Z74.09  · Generalized weakness     780.79/R53.1  Will get in contact with the  to set up a bariatric bedside commode and a bariatric shower chair.  · Inpatient hospitalization within last 30 days     V49.89/Z78.9  · Lower extremity edema     782.3/R60.0  4+ edema bilateral lower extremities patient has been off of her Lasix since discharge from the hospital on 3/23/21. Patient states there was a mixup at discharge initially she was told not to take the Lasix, cardiology was contacted, they agreed she needed to stay on her Lasix. Discharge papers were never reprinted to say continue Lasix and when case management called patient to set up the IPF she was told again to stop her Lasix. We will start patient back on her Lasix with a 2-week follow-up.      Plan  · Orders  o Sleep Study (Basic Sleep Apnea) [facility] ([address]270) - 780.52/G47.00 - 04/01/2021  o ACO-14: Influenza immunization was not administered for reasons documented [facility] () - - 03/31/2021  o Discharge medications reconciled with the current medication list (1111F) - - 03/31/2021  o ACO-39: Current medications updated and reviewed (1159F, ) - - 03/31/2021  o PULMONARY CONSULTATION (PULMO) - V46.2/Z99.81, V12.55/Z86.711, 428.0/I50.9, 786.09/R06.00 - 03/31/2021   [facility] pulmonolgy, [facility] D/C with pulomary emboli and oxygen dependent  o Home Health Consultation (HOMHE) - V49.89/Z74.09, 780.79/R53.1 -  03/31/2021  · Medications  o methotrexate sodium 2.5 mg oral tablet   SIG: take 4 tablets (10 mg) by oral route once weekly   DISP: (16) Tablet with 4 refills  Prescribed on 03/31/2021     o pantoprazole 40 mg oral tablet,delayed release (DR/EC)   SIG: take 1 tablet (40 mg) by oral route once daily for 90 days   DISP: (90) Tablet with 1 refills  Prescribed on 03/31/2021     o Acidophilus oral capsule   SIG: take 1 capsule by oral route 2 times a day for 90 days   DISP: (180) Capsule with 1 refills  Adjusted on 03/31/2021     o Advair -21 mcg/actuation inhalation HFA aerosol inhaler   SIG: inhale 2 puffs by inhalation route 2 times per day in the morning and evening for 30 days   DISP: (1) Canister with 5 refills  Adjusted on 03/31/2021     o albuterol sulfate 90 mcg/actuation inhalation HFA aerosol inhaler   SIG: inhale 2 puffs (180 mcg) by inhalation route every 4-6 hours as needed for 30 days   DISP: (1) Canister with 4 refills  Adjusted on 03/31/2021     o ascorbic acid (vitamin C) 250 mg oral tablet   SIG: take 2 tablets by oral route QD with iron   DISP: (90) Tablet with 1 refills  Adjusted on 03/31/2021     o aspirin 81 mg oral tablet,delayed release (DR/EC)   SIG: take 1 tablet (81 mg) by oral route once daily for 90 days   DISP: (90) Tablet with 1 refills  Adjusted on 03/31/2021     o ferrous sulfate 325 mg (65 mg iron) oral tablet   SIG: take 1 tablet (325 mg) by oral route once daily for 90 days   DISP: (90) Tablet with 1 refills  Adjusted on 03/31/2021     o Fosamax 70 mg oral tablet   SIG: take 1 tablet (70 mg) PO weekly in the morning, at least 30 min before first food, beverage, or medication of day   DISP: (13) Tablet with 1 refills  Adjusted on 03/31/2021     o hydroxyzine HCl 25 mg oral tablet   SIG: take 1 tablet (25 mg) by oral route 3 times per day as needed for 30 days   DISP: (90) Tablet with 1 refills  Adjusted on 03/31/2021     o tramadol 50 mg oral tablet   SIG: take 1 tablet (50 mg)  by oral route every 4 hours as needed for 30 days   DISP: (60) Tablet with 1 refills  Adjusted on 03/31/2021     o Xanax 0.25 mg oral tablet   SIG: take 1 tablet by oral route 2 times a day for 30 days   DISP: (60) Tablet with 0 refills  Adjusted on 03/31/2021     o amoxicillin 875 mg oral tablet   SIG: take 1 tablet (875 mg) by oral route every 8 hours X 5 days   DISP: (15) Tablet with 0 refills  Discontinued on 03/31/2021     o Bactrim -160 mg oral tablet   SIG: take 1 tablet by oral route once daily   DISP: (0) Tablet with 0 refills  Discontinued on 03/31/2021     o Norco 5-325 mg oral tablet   SIG: take 1 tablet by oral route every 4 hours as needed for pain   DISP: (0) Tablet with 0 refills  Discontinued on 03/31/2021     o Tessalon Perles 100 mg oral capsule   SIG: take 2 capsules (200 mg) by oral route 3 times per day as needed for cough   DISP: (0) Capsule with 0 refills  Discontinued on 03/31/2021     · Instructions  o Take all medications as prescribed/directed.  o Patient was educated/instructed on their diagnosis, treatment and medications prior to discharge from the clinic today.  o Call the office with any concerns or questions.  o Patient discharge summary has been reviewed and placed in patient's electronic medical record.  o Patient received a phone call from my office within 2 business days of discharge from inpatient status, and documented within the patient's chart.  o Also patient was seen (face to face) for follow up evaluation within 14 calendar days of discharge from inpatient status for a severe complexity issue.  o Patient was educated on their diagnosis, treatment and any medication changes while being evaluated today.  · Disposition  o Call or Return if symptoms worsen or persist.  o follow up as needed  o call the office with any questions or concerns  o Follow-up in 2 weeks            Electronically Signed by: Ruiz Bird APRN -Author on April 1, 2021 07:50:47 AM

## 2021-05-14 NOTE — PROGRESS NOTES
Progress Note      Patient Name: Will Apodaca   Patient ID: [id number]   Sex: Female   YOB: 1955    Primary Care Provider: Ruiz KHAN   Referring Provider: Ruiz KHAN    Visit Date: April 14, 2021    Provider: ERIN Bello   Location: [facility]   Location Address: [address] [place] [address], [address] [place] [address]  [id number]   Location Phone: [phone]          Chief Complaint  · 2 week follow-up  · Bedside commode with removable arms, and heavy-duty wheelchair is needed for patient. Patient's body configuration and patient's inability to transfer normally makes these items a necessity for patient to do her ADLs.      History Of Present Illness  Will Apodaca is a 65 year old /White female who presents for evaluation and treatment of:      65-year-old female who comes in for 2-week follow-up.  I seen patient in the office 2 weeks ago, had increased her Lasix due to severe swelling in her legs and feet.  During the 2 weeks patient was hospitalized again for acute on chronic congestive heart failure, and cellulitis.  She was discharged with cephalexin, she has an appointment with neurology per anderson they think she has been having some mini strokes.  She has seen Dr. Kessler cardiology in the past but due to her recent hospitalization has not called back to get an appointment.  Patient does have an upcoming appointment with pulmonology and sleep clinic.  She is currently on 3 L of oxygen, and O2 sats are stable at 97%.  Significant past medical history includes saddle PEs, exacerbation of congestive heart failure, and cellulitis bilateral lower extremities.    Per oliviaece, patient staying with her and accompanying her to her doctor's appointments her swelling in her legs have gone down, they are still weeping, they are wrapping an area where there is infection placing Bactroban and taking antibiotics as prescribed when she was in the  hospital.    Patient has steroid-induced diabetes.  She has been on prednisone for over a year due to her skin condition.  She is now on insulin injections, her diabetes is not controlled, her blood sugars run up to 400 frequently.  She does not watch what she eats, she is not watching her sodium intake either.  We will recheck a hemoglobin A1c CBC CMP on patient today.    Patient was in the hospital she was anemic with a hemoglobin of 8.8 they did do a blood transfusion in the hospital.  She is on iron twice daily due to her chronic anemia.    Patient has an appointment Friday to get her infusion for her skin condition at [facility].  This is to try to get patient off of her high-dose steroids.  She is also on methotrexate for her skin condition.       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 --    Tinnitus --  --          Past Surgical History  Procedure Name Date Notes   Correction of right clubfoot --  --          Medication List  Name Date Started Instructions   Acidophilus oral capsule 03/31/2021 take 1 capsule by oral route 2 times a day for 90 days   Advair -21 mcg/actuation inhalation HFA aerosol inhaler 03/31/2021 inhale 2 puffs by inhalation route 2 times per day in the morning and evening for 30 days   albuterol sulfate 90 mcg/actuation inhalation HFA aerosol inhaler 03/31/2021 inhale 2 puffs (180 mcg) by inhalation route every 4-6 hours as needed for 30 days   alendronate 70 mg oral tablet  take 1 tablet (70 mg) by oral route once weekly in the morning, at least 30 min before first food, beverage, or medication of day   ascorbic acid (vitamin C) 250 mg oral tablet 03/31/2021 take 2 tablets by oral route QD with iron   aspirin 81 mg oral tablet,delayed release (DR/EC) 03/31/2021 take 1 tablet (81 mg) by oral route once daily for  90 days   betamethasone dipropionate 0.05 % topical ointment 08/15/2019 apply a thin layer to the affected area(s) by topical route 2 times per day for 14 days   cefdinir 300 mg oral capsule  take 1 capsule (300 mg) by oral route every 12 hours for 14 days   cholecalciferol (vitamin D3) 10 mcg (400 unit) oral tablet  take 1 tablet by oral route daily   cyclobenzaprine 10 mg oral tablet 01/15/2021 take 1 tablet (10 mg) by oral route once daily at bedtime for 90 days   ferrous sulfate 325 mg (65 mg iron) oral tablet 03/31/2021 take 1 tablet (325 mg) by oral route once daily for 90 days   fluticasone propionate 50 mcg/actuation nasal spray,suspension 01/15/2021 spray 1 spray (50 mcg) in each nostril by intranasal route 2 times per day for nasal congestion and allergy symptoms   folic acid 0.8 mg oral capsule 03/31/2021 take 1 capsule by oral route daily for 90 days   folic acid 1 mg oral tablet 03/31/2021 take 1 tablet (1 mg) by oral route once daily for 90 days   Fosamax 70 mg oral tablet 03/31/2021 take 1 tablet (70 mg) PO weekly in the morning, at least 30 min before first food, beverage, or medication of day   gentamicin 0.1 % topical ointment  apply a small amount to the affected area by topical route 2 times per day   hydroxyzine HCl 25 mg oral tablet 03/31/2021 take 1 tablet (25 mg) by oral route 3 times per day as needed for 30 days   insulin lispro 100 unit/mL subcutaneous insulin pen  inject by subcutaneous route 3 units with meals   Lantus U-100 Insulin 100 unit/mL subcutaneous solution  inject 9 units by subcutaneous route daily   methotrexate sodium 2.5 mg oral tablet 03/31/2021 take 4 tablets (10 mg) by oral route once weekly   miconazole nitrate 2 % topical aerosol powder  apply spray to the affected area(s) by topical route 2 times per day in the morning and evening   mupirocin 2 % topical ointment  apply a small amount to the affected area by topical route 2 times per day   pantoprazole 40 mg oral  tablet,delayed release (DR/EC) 03/31/2021 take 1 tablet (40 mg) by oral route once daily for 90 days   prednisone 50 mg oral tablet  take 2 tablets (100 mg) by oral route once daily   tramadol 50 mg oral tablet 03/31/2021 take 1 tablet (50 mg) by oral route every 4 hours as needed for 30 days   Ventolin HFA 90 mcg/actuation inhalation HFA aerosol inhaler  inhale 2 puffs (180 mcg) by inhalation route every 6 hours as needed   Xanax 0.25 mg oral tablet 03/31/2021 take 1 tablet by oral route 2 times a day for 30 days   zinc 50 mg oral tablet  take 1 tablet by oral route daily         Allergy List  Allergen Name Date Reaction Notes   NO KNOWN DRUG ALLERGIES --  --  --          Family Medical History  Disease Name Relative/Age Notes   Family history of stroke Sister/40   --    Family history of heart disease Father/  Mother/  Sister/40   --          Social History  Finding Status Start/Stop Quantity Notes   Alcohol Never --/-- --  --    Tobacco Never --/-- --  --          Immunizations  NameDate Admin Mfg Trade Name Lot Number Route Inj VIS Given VIS Publication   InfluenzaRefused 09/17/2020 NE Not Entered  NE NE     Comments:          Review of Systems  · Constitutional  o Denies  o : fever, fatigue, weight loss, weight gain  · Eyes  o Denies  o : impaired vision, blurred vision, changes in vision  · HENT  o Admits  o : Hard of hearing  o Denies  o : headaches, vertigo, lightheadedness  · Cardiovascular  o Admits  o : lower extremity edema  o Denies  o : claudication, chest pressure, palpitations  · Respiratory  o Admits  o : shortness of breath (oxygen dependent)  o Denies  o : wheezing, cough, hemoptysis, dyspnea on exertion  · Gastrointestinal  o Denies  o : nausea, vomiting, diarrhea, constipation, abdominal pain  · Integument  o Admits  o : rash, pigmentation changes, skin dryness, new skin lesions  o Denies  o : hair growth change, nail changes  · Neurologic  o Admits  o : muscular weakness  o Denies  o : altered  "mental status  · Psychiatric  o Admits  o : anxiety  o Denies  o : depression, suicidal ideation, homicidal ideation      Vitals  Date Time BP Position Site L\R Cuff Size HR RR TEMP (F) WT  HT  BMI kg/m2 BSA m2 O2 Sat FR L/min FiO2 HC       04/14/2021 02:40 /82 Sitting    104 - R   285lbs 0oz 5'  4\" 48.92 2.42 97 %            Physical Examination  · Constitutional  o Appearance  o : morbidly obese, well developed, alert, in some distress, large body habitus, appears to be chronically ill   · Eyes  o Conjunctivae  o : conjunctivae normal, no exudates present  o Sclerae  o : sclerae white  o Pupils and Irises  o : pupils equal and round, and reactive to light and accomodation bilaterally  o Eyelids/Ocular Adnexae  o : extra ocular movements intact  · Respiratory  o Respiratory Effort  o : breathing unlabored, no accessory muscle use  o Inspection of Chest  o : normal appearance, no retractions  o Auscultation of Lungs  o : normal breath sounds bilaterally  · Cardiovascular  o Heart  o :   § Auscultation of Heart  § : regular rate and rhythm, no murmurs, gallops or rubs  o Peripheral Vascular System  o :   § Extremities  § : brawny edema present, no cyanosis, distal hair loss present in legs, normal capillary refill  · Skin and Subcutaneous Tissue  o General Inspection  o : generalized redness, bilateral lower extremity with clear/bloody weeping  · Neurologic  o Mental Status Examination  o :   § Orientation  § : alert and oriented x3  § Speech/Language  § : normal speech pattern  o Gait and Station  o : able to take a few steps with assistance, unsteady when standing   · Psychiatric  o Judgement and Insight  o : judgment and insight intact, judgement for everyday activities and social situations within normal limits, insight intact  o Thought Processes  o : rate of thoughts normal, thought content logical  o Mood and Affect  o : mood normal, affect appropriate              Assessment  · Complex care " coordination     V65.49/Z71.89  Trace Caicedo is currently working with patient. Patient would benefit from home health but unable to get home health within the home due to passport not covering any home health needs at this time. She is also in need of a hospital bed, bariatric bedside commode, bariatric wheelchair, and bariatric shower chair.  · Anemia     285.9/D64.9  Will recheck labs since patient received a blood transfusion in hospital to see how they are as of now.  · CHF (congestive heart failure)     428.0/I50.9  Patient continues to have swelling bilateral lower extremities have slightly improved since I seen her last 2 weeks but I am going to increase her Lasix to 80 mg for 2 weeks to try to get the swelling better, once we can get the swelling under control maintain the Lasix dose. Encourage patient to call [facility] to get an appointment with Dr. Kessler her cardiologist. Patient was given number in office.  · Diabetes mellitus, type 2     250.00/E11.9  Due to the steroid-induced diabetes, patient currently still on steroids her blood sugar is not controlled. I am going to refer her down to Eve Harry nurse practitioner for endocrinologist for further management. We did have a long discussion about patient's diet, avoiding sodas, avoid eating out, eating more healthier. Patient and niece verbalized understanding.  · Obesity     278.00/E66.9  · Vitamin D deficiency     268.9/E55.9      Plan  · Orders  o B12 Folate levels (B12FO) - 285.9/D64.9 - 04/14/2021  o CBC with Auto Diff [facility] (46264) - 285.9/D64.9 - 04/14/2021  o Iron panel (iron, TIBC, transferrin saturation) (40910, 37344, 49665) - 285.9/D64.9 - 04/14/2021  o Ferritin ser/plas (79510) - 285.9/D64.9 - 04/14/2021  o CMP [facility] (63820) - 250.00/E11.9 - 04/14/2021  o Hgb A1c [facility] (49484) - 250.00/E11.9 - 04/14/2021  o Thyroid Profile (23521, 00343, THYII) - 250.00/E11.9 - 04/14/2021  o ENDOCRINOLOGY CONSULTATION (ENDOC) - 250.00/E11.9 -  04/14/2021   Eve Aldana  o Vitamin D Level (26108) - 268.9/E55.9 - 04/14/2021  o ACO-39: Current medications updated and reviewed (1159F, ) - - 04/14/2021  · Medications  o Lasix 80 mg oral tablet   SIG: take 1 tablet (80 mg) by oral route 2 times per day for 30 days   DISP: (60) Tablet with 0 refills  Prescribed on 04/14/2021     o Medications have been Reconciled  o Transition of Care or Provider Policy  · Instructions  o *[facility] CARE PLAN INSTRUCTIONS:  o Information about new prescriptions provided if applicable.  o Specific patient education resources/handouts were provided for the below selected diagnoses as part of the initiation of the patient care plan.  o *[facility] Individual Care Plan began/reviewed today for the following conditions:  o *Self-Management Tools Provided:  o Please reference the Patient's Care Plan Correspondence Document.  o Continue blood sugar monitoring daily and record. Bring your log to office visits. Call the office for readings below 70 and above 250 or any complications.  o Daily foot care. Avoid walking barefoot. Annual Dilated Eye Exam.  o Discussed with patient blood pressure monitoring, hemoglobin A1C levels need to be below 7.0, and LDL (Lipid) goals below 70.  o Take all medications as prescribed/directed.  o Patient was educated/instructed on their diagnosis, treatment and medications prior to discharge from the clinic today.  o Call the office with any concerns or questions.  · Disposition  o Call or Return if symptoms worsen or persist.  o follow up as needed  o call the office with any questions or concerns  o Follow-up in 2 weeks            Electronically Signed by: ERIN Bello -Author on April 26, 2021 08:51:27 AM

## 2021-05-14 NOTE — PROGRESS NOTES
Progress Note      Patient Name: Will Apodaca   Patient ID: [id number]   Sex: Female   YOB: 1955    Primary Care Provider: Ruiz KHAN   Referring Provider: Ruiz KHAN    Visit Date: March 15, 2021    Provider: ERIN Bello   Location: [facility]   Location Address: [address] [place] [address], [address] [place] [address]  [id number]   Location Phone: [phone]          Chief Complaint  · follow-up      History Of Present Illness  Will Apodaca is a 65 year old /White female who presents for evaluation and treatment of:      Patient is a 65-year-old female who comes in for follow-up.  When patient entered the room we noticed some central peripheral cyanosis and shortness of breath.  Initial O2 sats were 76%.  Patient's been having chronic ongoing shortness of breath but feels like it is gotten worse over the past couple days.  No cough or congestion.  She does have a history of asthma, she has been using home Advair and nebulizer treatments without significant improvement.  She has a chronic condition called polygamous foliaceous and is currently on 130 mg of prednisone daily.  Due to the high-dose of steroids she has not been steroid-induced diabetic is currently on insulin.  States states her blood sugar was over 600 last night, she got an extra dose of her insulin.    Niece accompanied patient, she has a history of reoccurring cellulitis bilateral lower extremities due to her skin condition.  He states that she feels like it has come back, there is erythema diffusely with swelling in bilateral lower extremities.  She denies any fever or chills.  She is unable to walk on her legs due to the pain and is being pushed around on her Rollator walker.       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 --    Tinnitus --  --          Past Surgical History  Procedure Name Date Notes   Correction of right clubfoot --  --          Medication List  Name Date Started Instructions   Acidophilus oral capsule 12/30/2020 take 1 capsule by oral route 2 times a day for 30 days   Advair -21 mcg/actuation inhalation HFA aerosol inhaler 01/15/2021 inhale 2 puffs by inhalation route 2 times per day in the morning and evening for 30 days   albuterol sulfate 90 mcg/actuation inhalation HFA aerosol inhaler 10/21/2020 inhale 2 puffs (180 mcg) by inhalation route every 4-6 hours as needed for 30 days   alendronate 70 mg oral tablet  take 1 tablet (70 mg) by oral route once weekly in the morning, at least 30 min before first food, beverage, or medication of day   ascorbic acid (vitamin C) 250 mg oral tablet  take 2 tablets by oral route QD with iron   aspirin 81 mg oral tablet,delayed release (DR/EC)  take 1 tablet (81 mg) by oral route once daily for 30 days   Bactrim -160 mg oral tablet  take 1 tablet by oral route once daily   betamethasone dipropionate 0.05 % topical ointment 08/15/2019 apply a thin layer to the affected area(s) by topical route 2 times per day for 14 days   cholecalciferol (vitamin D3) 10 mcg (400 unit) oral tablet  take 1 tablet by oral route daily   cyclobenzaprine 10 mg oral tablet 01/15/2021 take 1 tablet (10 mg) by oral route once daily at bedtime for 90 days   ferrous sulfate 325 mg (65 mg iron) oral tablet  take 1 tablet (325 mg) by oral route once daily   fluticasone propionate 50 mcg/actuation nasal spray,suspension 01/15/2021 spray 1 spray (50 mcg) in each nostril by intranasal route 2 times per day for nasal congestion and allergy symptoms   folic acid 0.8 mg oral capsule  take 1 capsule by oral route daily   folic acid 1 mg oral tablet  take 1 tablet (1 mg) by oral route once daily   Fosamax 70 mg oral tablet  take 1 tablet (70 mg) by oral route once weekly in the morning, at  least 30 min before first food, beverage, or medication of day   gentamicin 0.1 % topical ointment  apply a small amount to the affected area by topical route 2 times per day   hydroxyzine HCl 25 mg oral tablet 01/15/2021 take 1 tablet (25 mg) by oral route 3 times per day as needed for 30 days   insulin lispro 100 unit/mL subcutaneous insulin pen  inject by subcutaneous route 3 units with meals   Lantus U-100 Insulin 100 unit/mL subcutaneous solution  inject 9 units by subcutaneous route daily   Lasix 40 mg oral tablet  take 1 tablet (40 mg) by oral route 2 times per day   methotrexate sodium 2.5 mg oral tablet  take 2 tablets (5 mg) by oral route once weekly starting 1-13   miconazole nitrate 2 % topical aerosol powder  apply spray to the affected area(s) by topical route 2 times per day in the morning and evening   mupirocin 2 % topical ointment  apply a small amount to the affected area by topical route 2 times per day   Norco 5-325 mg oral tablet  take 1 tablet by oral route every 4 hours as needed for pain   nystatin topical  apply BID as needed   pantoprazole 40 mg oral tablet,delayed release (DR/EC)  take 1 tablet (40 mg) by oral route once daily   prednisone 50 mg oral tablet  take 2 tablets (100 mg) by oral route once daily   sulfamethoxazole-trimethoprim 200-40 mg/5 mL oral suspension 02/04/2021 take 20 milliliters by oral route every 12 hours for 10 days   Tessalon Perles 100 mg oral capsule  take 2 capsules (200 mg) by oral route 3 times per day as needed for cough   tramadol 50 mg oral tablet 02/04/2021 take 1 tablet (50 mg) by oral route every 4 hours as needed for 30 days   Ventolin HFA 90 mcg/actuation inhalation HFA aerosol inhaler  inhale 2 puffs (180 mcg) by inhalation route every 6 hours as needed   Xanax 0.25 mg oral tablet 02/04/2021 take 1 tablet by oral route 2 times a day for 30 days   zinc 50 mg oral tablet  take 1 tablet by oral route daily         Allergy List  Allergen Name Date  "Reaction Notes   NO KNOWN DRUG ALLERGIES --  --  --        Allergies Reconciled  Family Medical History  Disease Name Relative/Age Notes   Family history of stroke Sister/40   --    Family history of heart disease Father/  Mother/  Sister/40   --          Social History  Finding Status Start/Stop Quantity Notes   Alcohol Never --/-- --  --    Tobacco Never --/-- --  --          Immunizations  NameDate Admin Mfg Trade Name Lot Number Route Inj VIS Given VIS Publication   InfluenzaRefused 09/17/2020 NE Not Entered  NE NE     Comments:          Review of Systems  · Constitutional  o Denies  o : fever, fatigue, weight loss, weight gain  · Eyes  o Denies  o : impaired vision, blurred vision, changes in vision  · HENT  o Denies  o : headaches, vertigo, lightheadedness  · Cardiovascular  o Admits  o : lower extremity edema  o Denies  o : claudication, chest pressure, palpitations  · Respiratory  o Admits  o : shortness of breath, wheezing, cough, dyspnea on exertion  o Denies  o : hemoptysis  · Gastrointestinal  o Denies  o : nausea, vomiting, diarrhea, constipation, abdominal pain  · Integument  o Admits  o : pigmentation changes (erythema bilateral lower extremities), central and peripheral cyanosis  o Denies  o : rash, itching  · Musculoskeletal  o Admits  o : muscle pain, bilateral leg pain  o Denies  o : joint swelling  · Psychiatric  o Admits  o : anxiety      Vitals  Date Time BP Position Site L\R Cuff Size HR RR TEMP (F) WT  HT  BMI kg/m2 BSA m2 O2 Sat FR L/min FiO2 HC       03/15/2021 03:35 /70 Sitting    110 - R    5'  4\"               Physical Examination  · Constitutional  o Appearance  o : well-nourished, well developed, in no acute distress  · Eyes  o Conjunctivae  o : conjunctivae normal, no exudates present  o Sclerae  o : sclerae white  o Pupils and Irises  o : pupils equal and round, and reactive to light and accomodation bilaterally  o Eyelids/Ocular Adnexae  o : extra ocular movements " intact  · Ears, Nose, Mouth and Throat  o Oral Cavity  o : Central cyanosis of the lips  · Respiratory  o Respiratory Effort  o : breathing unlabored, no accessory muscle use  o Inspection of Chest  o : normal appearance, no retractions  o Auscultation of Lungs  o : Diminished bilateral throughout  · Cardiovascular  o Heart  o :   § Auscultation of Heart  § : regular rate and rhythm, no murmurs, gallops or rubs  o Peripheral Vascular System  o :   § Extremities  § : Marked lower extremity edema, erythema bilateral lower extremities consistent with cellulitis  · Skin and Subcutaneous Tissue  o Digits and Nails  o : cyanosis present   · Neurologic  o Mental Status Examination  o :   § Orientation  § : alert and oriented x3  § Speech/Language  § : normal speech pattern  o Gait and Station  o : normal gait, able to stand without difficulty  · Psychiatric  o Judgement and Insight  o : judgment and insight intact, judgement for everyday activities and social situations within normal limits, insight intact  o Thought Processes  o : rate of thoughts normal, thought content logical  o Mood and Affect  o : anxiety, affect appropriate              Assessment  · Anxiety disorder     300.00/F41.9  · Hypoxia     799.02/R09.02  Discussed with niece that patient needs to the hospital immediately. I was able to get her O2 sats up with oxygen. Patient was placed on 10 L for about 5 minutes O2 sats to get up into the 96 percentile. Knees refuses ambulance to [facility]. She states she will take her up to [facility] in private vehicle. Risk was given to family and niece they verbalized understanding. Report given to senior living nurse at [facility].  · Shortness of breath     786.05/R06.02  · Cellulitis     682.9/L03.90  Sent to ER they can evaluate her cellulitis at that time.  · Steroid-induced diabetes mellitus       Drug or chemical induced diabetes mellitus without  complications     249.00/E09.9  Adverse effect of glucocorticoids and synthetic analogues, initial encounter     249.00/T38.0X5A  Instructed to go to the emergency room. Per patient her glucose was over 600. She is going for hypoxia they can evaluate her blood sugar at that time.  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  o Patient was educated/instructed on their diagnosis, treatment and medications prior to discharge from the clinic today.  o Call the office with any concerns or questions.  o Flu vaccine declined.  · Disposition  o Call or Return if symptoms worsen or persist.  o follow up as needed  o call the office with any questions or concerns            Electronically Signed by: ERIN Bello -Author on March 15, 2021 04:47:39 PM

## 2021-05-14 NOTE — PROGRESS NOTES
Progress Note      Patient Name: Will Apodaca   Patient ID: [id number]   Sex: Female   YOB: 1955    Primary Care Provider: Ruiz KHAN   Referring Provider: Ruiz KHAN    Visit Date: February 4, 2021    Provider: ERIN Blelo   Location: [facility]   Location Address: [address] [place] [address], [address]  [id number]   Location Phone: [phone]          Chief Complaint  · Follow up office visit within 7 calendar days of discharge from inpatient status (high complexity).      History Of Present Illness  Video Conferencing Visit  Will Apodaca is a 65 year old /White female who is presenting for evaluation via video conferencing via [employer] visit. Verbal consent obtained before beginning visit.   The following staff were present during this visit: Ramya De La Paz/Ruiz Bird   TELEHEALTH TELEPHONE VISIT  Will Apodaca is a 65 year old /White female who is presenting for evaluation via telehealth telephone visit. Verbal consent obtained before beginning visit.   Provider spent 25 minutes with patient during telehealth visit.   The following staff were present during this visit: Ruiz Bird   Past Medical History/Overview of Patient Symptoms  FOLLOW UP OFFICE VISIT WITHIN 7 CALENDAR DAYS OF INPATIENT STATUS (SEVERE COMPLEXITY)  Will Apodaca presents to office for follow up post discharge from inpatient status within 7 calendar days. Patient was contacted within 2 business days via phone conversation. Documentation of that phone contact is present in the patient's electronic chart. Patient was admitted to an inpatient faciliity on 01/22/2021 and discharged on 01/27/2021 due to: pemphigus foliaceus   Admitting MD: Franky Alex MD   Her discharge summary has been reviewed and placed in the patient's electronic chart.   Patient's problem list is: Anxiety, Hearing loss, Hypercalcemia, Pemphigus  foliaceous, and Tinnitus   Patient's outpatient medication list has been reconciled with the medication list from the discharge summary and has been reviewed with the patient. Current medication list is: Acidophilus oral capsule, Advair -21 mcg/actuation inhalation HFA aerosol inhaler, albuterol sulfate 90 mcg/actuation inhalation HFA aerosol inhaler, alendronate 70 mg oral tablet, ascorbic acid (vitamin C) 250 mg oral tablet, aspirin 81 mg oral tablet,delayed release (DR/EC), Bactrim -160 mg oral tablet, betamethasone dipropionate 0.05 % topical ointment, cholecalciferol (vitamin D3) 10 mcg (400 unit) oral tablet, cyclobenzaprine 10 mg oral tablet, ferrous sulfate 325 mg (65 mg iron) oral tablet, fluticasone propionate 50 mcg/actuation nasal spray,suspension, folic acid 0.8 mg oral capsule, folic acid 1 mg oral tablet, Fosamax 70 mg oral tablet, gentamicin 0.1 % topical ointment, hydroxyzine HCl 25 mg oral tablet, insulin lispro 100 unit/mL subcutaneous insulin pen, Lantus U-100 Insulin 100 unit/mL subcutaneous solution, Lasix 40 mg oral tablet, methotrexate sodium 2.5 mg oral tablet, miconazole nitrate 2 % topical aerosol powder, mupirocin 2 % topical ointment, Norco 5-325 mg oral tablet, nystatin topical, pantoprazole 40 mg oral tablet,delayed release (DR/EC), prednisone 50 mg oral tablet, Tessalon Perles 100 mg oral capsule, tramadol 50 mg oral tablet, Ventolin HFA 90 mcg/actuation inhalation HFA aerosol inhaler, and zinc 50 mg oral tablet      Patient is a 65-year-old female whom I spoke with via [employer] for IPF from [facility].  Patient was admitted for sepsis, and her skin condition for pemphigus foliaceous.  Patient has had multiple ongoing issues with the skin condition.  She had a severe outbreak where her whole body developed using the lesions and crustiness she was in significant amount of pain or shortness of breath and had to be hospitalized.  She is currently on methotrexate  they have 130 mg of prednisone daily and she is taking Bactrim.  Her skin is improving but that large doses of prednisone is causing her to have to use insulin to keep her blood sugars controlled.  Patient was discharged with 90 units of Lantus daily and 30 units of Humalog twice daily.  Her blood sugars today was 358, yesterday they were 296.  This is occurred since she has been on a large dose of prednisone.  She continues to feel more panicked and short of breath due to her large doses of prednisone she is now having panic attacks which is new.       Review of Systems  · Constitutional  o Denies  o : fever, weight gain, weight loss, fatigue  · Cardiovascular  o Denies  o : palpitation, chest pain, claudication, lower extremity edema  · Respiratory  o Admits  o : shortness of breath  o Denies  o : hemoptysis, wheezing, dry cough, productive cough  · Gastrointestinal  o Denies  o : nausea, vomiting, diarrhea, constipation, abdominal pain  · Integument  o Admits  o : rash, pigmentation changes, skin dryness  o Denies  o : hair growth change, new skin lesions  · Neurologic  o Denies  o : unsteady gait, weakness, dizziness, H/A  · Psychiatric  o Admits  o : anxiety, Panic attacks  o Denies  o : depression      Physical Examination  · Constitutional  o Appearance  o : well-nourished, well developed, in no acute distress  · Respiratory  o Respiratory Effort  o : breathing unlabored, no accessory muscle use  o Inspection of Chest  o : normal appearance, no retractions  · Skin and Subcutaneous Tissue  o General Inspection  o : no rashes or lesions present, no areas of discoloration  · Neurologic  o Mental Status Examination  o :   § Orientation  § : alert and oriented x3  § Speech/Language  § : normal speech pattern  · Psychiatric  o Judgement and Insight  o : judgment and insight intact, judgement for everyday activities and social situations within normal limits, insight intact  o Thought Processes  o : rate of thoughts  normal, thought content logical  o Mood and Affect  o : mood anxious, affect appropriate              Assessment  · 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 She is going to be starting on Rutamex for her skin condition she will have an infusion once and then 4 weeks later she will have a second infusion to see if it helps her skin condition and they can taper off her prednisone. We will continue to monitor patient's progress to this closely. Discussed return precautions. Patient verbalized understanding.  · Panic attack due to exceptional stress       Panic disorder [episodic paroxysmal anxiety]     308.0/F41.0  Acute stress reaction     308.0/F43.0  Will start on 0.25 of Xanax twice a day until patient can start tapering off her large doses of the prednisone. Patient was very anxious on exam, will have patient follow-up in 1 week to see how she is doing.  · Inpatient hospitalization within last 30 days     V49.89/Z78.9  · Sepsis       Sepsis, unspecified organism     038.9/A41.9  Patient is unable to swallow the Bactrim oral pills and is wanting a liquid called over. Will send over liquid Bactrim follow-up in 1 week  · Steroid-induced Elevated blood sugar level     790.29/R73.9  Patient is on 90 units of Lantus 30 units of Humalog twice daily. Her blood sugars are running high I did discuss with niece if it is over 500 to give another 30 units of Humalog and will reassess patient's blood sugar levels with a 1 week log to see how she is doing. Discussed return precautions. Niece and patient verbalized understanding is agreeable treatment plan.      Plan  · Orders  o Discharge medications reconciled with the current medication list (1111F) - - 02/04/2021  o Physician Telephone Evaluation, 21-30 minutes (17426) - - 02/04/2021  o ACO-39: Current medications updated and reviewed (1159F, ) - - 02/04/2021  · Medications  o Xanax 0.25 mg oral tablet    SIG: take 1 tablet by oral route 2 times a day for 30 days   DISP: (60) Tablet with 0 refills  Prescribed on 02/04/2021     o sulfamethoxazole-trimethoprim 200-40 mg/5 mL oral suspension   SIG: take 20 milliliters by oral route every 12 hours for 10 days   DISP: (400) Milliliter with 0 refills  Prescribed on 02/04/2021     o tramadol 50 mg oral tablet   SIG: take 1 tablet (50 mg) by oral route every 4 hours as needed for 30 days   DISP: (60) Tablet with 1 refills  Adjusted on 02/04/2021     · Instructions  o Plan Of Care:   o Patient discharge summary has been reviewed and placed in patient's electronic medical record.  o Patient received a phone call from my office within 2 business days of discharge from inpatient status, and documented within the patient's chart.  o Also patient was seen (face to face) for follow up evaluation within 7 calendar days of discharge from inpatient status for a high complexity issue.  o Patient was educated on their diagnosis, treatment and any medication changes while being evaluated today.  · Disposition  o Call or Return if symptoms worsen or persist.  o call the office with any questions or concerns  o follow up in 1 week            Electronically Signed by: Ruiz Bird APRN -Author on February 4, 2021 03:38:05 PM

## 2021-05-14 NOTE — PROGRESS NOTES
Progress Note      Patient Name: Will Apodaca   Patient ID: [id number]   Sex: Female   YOB: 1955    Primary Care Provider: Ruiz KHAN   Referring Provider: Ruiz KHAN    Visit Date: April 27, 2021    Provider: ERIN Moss   Location: [facility]   Location Address: [address] [place] [address], [address] [place] [address]  [id number]   Location Phone: [phone]          Chief Complaint  · NINO - WEEPING LEGS      History Of Present Illness  Will Apodaca is a 65 year old /White female who presents for evaluation and treatment of:      Presents today for an acute visit for worsening cellulitis and wounds on bilateral lower extremities.  She was discharged from [facility] on 4/10/2021.  She was discharged on cephalexin 1000 mg 3 times daily.  Since being home the cellulitis and edema have become worse.  Her legs are continuously weeping.  The 2 wounds on her left lower extremity has increased in size.  She is tachycardic and is having intermittent shortness of breath.  She denies fever.  I am instructing her to go to the emergency room at [facility] for worsening cellulitis and possible sepsis with the increase heart rate and intermittent shortness of breath.    She has a history of pemphigus foliaceous and is taking prednisone 80 mg daily       Past Medical History  Anxiety; Broken Bones; Cellulitis; CHF (congestive heart failure); Diabetes; Hearing loss; Hypercalcemia; Pemphigus foliaceous; Tinnitus         Past Surgical History  Correction of right clubfoot         Medication List  Acidophilus oral capsule; Advair -21 mcg/actuation inhalation HFA aerosol inhaler; albuterol sulfate 90 mcg/actuation inhalation HFA aerosol inhaler; ascorbic acid (vitamin C) 250 mg oral tablet; aspirin 81 mg oral tablet,delayed release (DR/EC); betamethasone dipropionate 0.05 % topical ointment; cholecalciferol (vitamin D3) 10 mcg (400 unit) oral  "tablet; cyclobenzaprine 10 mg oral tablet; ferrous sulfate 325 mg (65 mg iron) oral tablet; fluticasone propionate 50 mcg/actuation nasal spray,suspension; folic acid 0.8 mg oral capsule; folic acid 1 mg oral tablet; Fosamax 70 mg oral tablet; hydroxyzine HCl 25 mg oral tablet; insulin lispro 100 unit/mL subcutaneous insulin pen; Lantus U-100 Insulin 100 unit/mL subcutaneous solution; Lasix 80 mg oral tablet; methotrexate sodium 2.5 mg oral tablet; miconazole nitrate 2 % topical aerosol powder; mupirocin 2 % topical ointment; pantoprazole 40 mg oral tablet,delayed release (DR/EC); prednisone 20 mg oral tablet; tramadol 50 mg oral tablet; Ventolin HFA 90 mcg/actuation inhalation HFA aerosol inhaler; Xanax 0.25 mg oral tablet; zinc 50 mg oral tablet         Allergy List  NO KNOWN DRUG ALLERGIES       Allergies Reconciled  Family Medical History  Family history of stroke; Family history of heart disease         Reproductive History  No Pertinent Reproductive History         Social History  Alcohol (Never); Tobacco (Never)         Immunizations  Name Date Admin   Influenza Refused   Influenza Refused         Review of Systems  · Constitutional  o Denies  o : fatigue, fever, headache, chills  · Cardiovascular  o Admits  o : rapid heart rate, dyspnea on exertion, lower extremity edema  o Denies  o : chest pain, irregular heart beats  · Respiratory  o Denies  o : shortness of breath, wheezing, cough, hemoptysis, dyspnea on exertion  · Gastrointestinal  o Denies  o : nausea, vomiting, diarrhea, constipation, abdominal pain  · Integument  o Admits  o : Weeping cellulitis and wounds  o Denies  o : skin dryness      Vitals  Date Time BP Position Site L\R Cuff Size HR RR TEMP (F) WT  HT  BMI kg/m2 BSA m2 O2 Sat FR L/min FiO2        04/27/2021 03:40 /72 Sitting    110 - R  98.1 299lbs 1oz 5'  4\" 51.33 2.47 94 %            Physical Examination  · Constitutional  o Appearance  o : morbidly obese, alert, in some distress "   · Head and Face  o Head  o :   § Inspection  § : atraumatic, normocephalic  o Face  o :   § Inspection  § : no facial lesions  o HEENT  o : Unremarkable  · Eyes  o Conjunctivae  o : conjunctivae normal  o Sclerae  o : sclerae white  o Pupils and Irises  o : pupils equal and round, pupils reactive to light bilaterally  o Eyelids/Ocular Adnexae  o : eyelid appearance normal  · Ears, Nose, Mouth and Throat  o Ears  o :   § External Ears  § : appearance within normal limits, no lesions present  o Nose  o :   § External Nose  § : appearance normal  o Oral Cavity  o :   § Oral Mucosa  § : oral mucosa normal  § Lips  § : lip appearance normal  § Teeth  § : normal dentition for age  § Gums  § : gums pink, non-swollen, no bleeding present  § Tongue  § : tongue appearance normal  § Palate  § : hard palate normal, soft palate appearance normal  · Neck  o Inspection/Palpation  o : normal appearance, no masses or tenderness, trachea midline  o Thyroid  o : gland size normal, nontender, no nodules or masses present on palpation  · Respiratory  o Respiratory Effort  o : breathing unlabored  o Auscultation of Lungs  o : normal breath sounds  · Cardiovascular  o Heart  o :   § Auscultation of Heart  § : regular rate, normal rhythm, no murmurs present  o Peripheral Vascular System  o :   § Extremities  § : no edema  · Gastrointestinal  o Abdominal Examination  o : abdomen nontender to palpation, normal bowel sounds, tone normal without rigidity or guarding, no masses present, abdominal obesity present, abdomen scaphoid upon supine  o Liver and spleen  o : no hepatomegaly present  · Lymphatic  o Neck  o : no lymphadenopathy   · Skin and Subcutaneous Tissue  o General Inspection  o : Bilateral lower extremity edema and cellulitis. Visualized cellulitis weeping with puddle on floor. Erythema. Right lateral lower extremity has an open to the skin with possible abscess, left lower extremity has approximately 4 x 4 centimeter ulceration  wound and a 1 x 1 cm wound with yellow slough.          Assessment  · Cellulitis     682.9/L03.90  Worsening cellulitis. Instructed niece to take patient to [facility] emergency room to evaluate patient to be admitted and treated with IV antibiotics.  · Wound of left lower extremity     891.0/S81.802A  Wound to the left lower extremity has increased in size there is also an additional wound above the ulceration that has yellow slough.      Plan  · Orders  o ACO-39: Current medications updated and reviewed (, 1159F) - - 04/27/2021  o Wound Culture with Sensitivities if indicated [facility] ([id number]) - 682.9/L03.90, 891.0/S81.802A - 04/27/2021  o WOUND CARE CONSULTATION (WOUND) - 682.9/L03.90, 891.0/S81.802A - 04/27/2021  · Medications  o Medications have been Reconciled  o Transition of Care or Provider Policy  · Instructions  o Patient was educated/instructed on their diagnosis, treatment and medications prior to discharge from the clinic today.  o Patient instructed to seek medical attention urgently for new or worsening symptoms.  o Call the office with any concerns or questions.            Electronically Signed by: ERIN Moss -Author on April 27, 2021 04:41:00 PM

## 2021-05-15 VITALS
HEART RATE: 117 BPM | OXYGEN SATURATION: 97 % | SYSTOLIC BLOOD PRESSURE: 134 MMHG | TEMPERATURE: 98.9 F | BODY MASS INDEX: 47.5 KG/M2 | HEIGHT: 64 IN | DIASTOLIC BLOOD PRESSURE: 78 MMHG | WEIGHT: 278.25 LBS

## 2021-05-15 VITALS
TEMPERATURE: 97.8 F | BODY MASS INDEX: 42.34 KG/M2 | HEART RATE: 85 BPM | HEIGHT: 64 IN | OXYGEN SATURATION: 96 % | SYSTOLIC BLOOD PRESSURE: 136 MMHG | DIASTOLIC BLOOD PRESSURE: 88 MMHG | WEIGHT: 248 LBS

## 2021-05-15 VITALS
OXYGEN SATURATION: 96 % | WEIGHT: 264.5 LBS | SYSTOLIC BLOOD PRESSURE: 118 MMHG | TEMPERATURE: 97.8 F | BODY MASS INDEX: 45.16 KG/M2 | HEIGHT: 64 IN | HEART RATE: 86 BPM | DIASTOLIC BLOOD PRESSURE: 80 MMHG

## 2021-05-15 VITALS
OXYGEN SATURATION: 98 % | BODY MASS INDEX: 41.32 KG/M2 | HEART RATE: 72 BPM | WEIGHT: 242 LBS | TEMPERATURE: 98.4 F | HEIGHT: 64 IN | SYSTOLIC BLOOD PRESSURE: 128 MMHG | DIASTOLIC BLOOD PRESSURE: 76 MMHG

## 2021-05-15 VITALS
WEIGHT: 260.5 LBS | SYSTOLIC BLOOD PRESSURE: 132 MMHG | TEMPERATURE: 97.6 F | HEIGHT: 64 IN | HEART RATE: 82 BPM | OXYGEN SATURATION: 96 % | BODY MASS INDEX: 44.47 KG/M2 | DIASTOLIC BLOOD PRESSURE: 86 MMHG

## 2021-05-15 VITALS
TEMPERATURE: 98 F | BODY MASS INDEX: 47.01 KG/M2 | OXYGEN SATURATION: 95 % | HEIGHT: 64 IN | WEIGHT: 275.37 LBS | SYSTOLIC BLOOD PRESSURE: 136 MMHG | DIASTOLIC BLOOD PRESSURE: 86 MMHG | HEART RATE: 112 BPM

## 2021-05-15 VITALS
SYSTOLIC BLOOD PRESSURE: 126 MMHG | DIASTOLIC BLOOD PRESSURE: 88 MMHG | HEIGHT: 64 IN | OXYGEN SATURATION: 97 % | TEMPERATURE: 97.8 F | BODY MASS INDEX: 50.02 KG/M2 | HEART RATE: 94 BPM | WEIGHT: 293 LBS

## 2021-05-15 VITALS
DIASTOLIC BLOOD PRESSURE: 70 MMHG | SYSTOLIC BLOOD PRESSURE: 130 MMHG | HEART RATE: 78 BPM | OXYGEN SATURATION: 97 % | HEIGHT: 64 IN | BODY MASS INDEX: 41.15 KG/M2 | TEMPERATURE: 98.1 F | WEIGHT: 241 LBS

## 2021-05-15 VITALS
WEIGHT: 281.37 LBS | DIASTOLIC BLOOD PRESSURE: 84 MMHG | TEMPERATURE: 98.1 F | OXYGEN SATURATION: 94 % | HEIGHT: 64 IN | HEART RATE: 105 BPM | BODY MASS INDEX: 48.04 KG/M2 | SYSTOLIC BLOOD PRESSURE: 132 MMHG

## 2021-05-15 VITALS
TEMPERATURE: 97.9 F | BODY MASS INDEX: 42.17 KG/M2 | DIASTOLIC BLOOD PRESSURE: 72 MMHG | SYSTOLIC BLOOD PRESSURE: 130 MMHG | HEART RATE: 85 BPM | WEIGHT: 247 LBS | HEIGHT: 64 IN | OXYGEN SATURATION: 94 %

## 2021-05-16 VITALS
SYSTOLIC BLOOD PRESSURE: 132 MMHG | BODY MASS INDEX: 42.04 KG/M2 | HEART RATE: 72 BPM | DIASTOLIC BLOOD PRESSURE: 66 MMHG | WEIGHT: 246.25 LBS | RESPIRATION RATE: 16 BRPM | HEIGHT: 64 IN | OXYGEN SATURATION: 96 % | TEMPERATURE: 97.9 F

## 2021-05-17 ENCOUNTER — CONVERSION ENCOUNTER (OUTPATIENT)
Dept: FAMILY MEDICINE CLINIC | Facility: CLINIC | Age: 66
End: 2021-05-17

## 2021-05-17 ENCOUNTER — OFFICE VISIT CONVERTED (OUTPATIENT)
Dept: FAMILY MEDICINE CLINIC | Facility: CLINIC | Age: 66
End: 2021-05-17
Attending: NURSE PRACTITIONER

## 2021-05-17 ENCOUNTER — HOSPITAL ENCOUNTER (OUTPATIENT)
Dept: OTHER | Facility: HOSPITAL | Age: 66
Discharge: HOME | End: 2021-05-17
Attending: NURSE PRACTITIONER

## 2021-05-17 LAB
ALBUMIN SERPL-MCNC: 3.4 G/DL (ref 3.5–5)
ALBUMIN/GLOB SERPL: 1.3 {RATIO} (ref 1.4–2.6)
ALP SERPL-CCNC: 167 U/L (ref 43–160)
ALT SERPL-CCNC: 31 U/L (ref 10–40)
ANION GAP SERPL CALC-SCNC: 21 MMOL/L (ref 8–19)
AST SERPL-CCNC: 18 U/L (ref 15–50)
BASOPHILS # BLD AUTO: 0.04 10*3/UL (ref 0–0.2)
BASOPHILS NFR BLD AUTO: 0.3 % (ref 0–3)
BILIRUB SERPL-MCNC: 0.16 MG/DL (ref 0.2–1.3)
BUN SERPL-MCNC: 21 MG/DL (ref 5–25)
BUN/CREAT SERPL: 19 {RATIO} (ref 6–20)
CALCIUM SERPL-MCNC: 9.7 MG/DL (ref 8.7–10.4)
CHLORIDE SERPL-SCNC: 88 MMOL/L (ref 99–111)
CONV ABS IMM GRAN: 0.37 10*3/UL (ref 0–0.2)
CONV CO2: 30 MMOL/L (ref 22–32)
CONV IMMATURE GRAN: 3.2 % (ref 0–1.8)
CONV TOTAL PROTEIN: 6 G/DL (ref 6.3–8.2)
CREAT UR-MCNC: 1.13 MG/DL (ref 0.5–0.9)
CRP SERPL-MCNC: 33.2 MG/L (ref 0–5)
DEPRECATED RDW RBC AUTO: 62.2 FL (ref 36.4–46.3)
EOSINOPHIL # BLD AUTO: 0.05 10*3/UL (ref 0–0.7)
EOSINOPHIL # BLD AUTO: 0.4 % (ref 0–7)
ERYTHROCYTE [DISTWIDTH] IN BLOOD BY AUTOMATED COUNT: 16.4 % (ref 11.7–14.4)
GFR SERPLBLD BASED ON 1.73 SQ M-ARVRAT: 51 ML/MIN/{1.73_M2}
GLOBULIN UR ELPH-MCNC: 2.6 G/DL (ref 2–3.5)
GLUCOSE SERPL-MCNC: 325 MG/DL (ref 65–99)
HCT VFR BLD AUTO: 35 % (ref 37–47)
HGB BLD-MCNC: 10.4 G/DL (ref 12–16)
LYMPHOCYTES # BLD AUTO: 2.1 10*3/UL (ref 1–5)
LYMPHOCYTES NFR BLD AUTO: 17.9 % (ref 20–45)
MCH RBC QN AUTO: 30.7 PG (ref 27–31)
MCHC RBC AUTO-ENTMCNC: 29.7 G/DL (ref 33–37)
MCV RBC AUTO: 103.2 FL (ref 81–99)
MONOCYTES # BLD AUTO: 1.26 10*3/UL (ref 0.2–1.2)
MONOCYTES NFR BLD AUTO: 10.8 % (ref 3–10)
NEUTROPHILS # BLD AUTO: 7.89 10*3/UL (ref 2–8)
NEUTROPHILS NFR BLD AUTO: 67.4 % (ref 30–85)
NRBC CBCN: 0.8 % (ref 0–0.7)
OSMOLALITY SERPL CALC.SUM OF ELEC: 296 MOSM/KG (ref 273–304)
PLATELET # BLD AUTO: 339 10*3/UL (ref 130–400)
PMV BLD AUTO: 10.7 FL (ref 9.4–12.3)
POTASSIUM SERPL-SCNC: 3.6 MMOL/L (ref 3.5–5.3)
RBC # BLD AUTO: 3.39 10*6/UL (ref 4.2–5.4)
SODIUM SERPL-SCNC: 135 MMOL/L (ref 135–147)
WBC # BLD AUTO: 11.71 10*3/UL (ref 4.8–10.8)

## 2021-05-20 LAB
BACTERIA SPEC AEROBE CULT: ABNORMAL
CEFAZOLIN SUSC ISLT: >=64
CEFEPIME SUSC ISLT: <=0.12
CEFTAZIDIME SUSC ISLT: <=1
CEFTRIAXONE SUSC ISLT: <=0.25
CIPROFLOXACIN SUSC ISLT: <=0.25
ERTAPENEM SUSC ISLT: <=0.12
GENTAMICIN SUSC ISLT: <=1
LEVOFLOXACIN SUSC ISLT: <=0.12
TMP SMX SUSC ISLT: <=20
TOBRAMYCIN SUSC ISLT: 2

## 2021-05-24 ENCOUNTER — OFFICE VISIT CONVERTED (OUTPATIENT)
Dept: PULMONOLOGY | Facility: CLINIC | Age: 66
End: 2021-05-24
Attending: SPECIALIST

## 2021-05-27 ENCOUNTER — TRANSCRIBE ORDERS (OUTPATIENT)
Dept: ADMINISTRATIVE | Facility: HOSPITAL | Age: 66
End: 2021-05-27

## 2021-05-27 DIAGNOSIS — J44.9 OBSTRUCTIVE CHRONIC BRONCHITIS WITHOUT EXACERBATION (HCC): Primary | ICD-10-CM

## 2021-05-28 VITALS
WEIGHT: 285 LBS | SYSTOLIC BLOOD PRESSURE: 116 MMHG | HEART RATE: 101 BPM | HEIGHT: 60 IN | BODY MASS INDEX: 55.95 KG/M2 | OXYGEN SATURATION: 93 % | RESPIRATION RATE: 18 BRPM | TEMPERATURE: 97.9 F | DIASTOLIC BLOOD PRESSURE: 54 MMHG

## 2021-05-28 NOTE — PROGRESS NOTES
Patient: ALEXIS APODACA     Acct: [id number]     Report: #[id number]  UNIT #: [id number]     : 1955    Encounter Date:2021  PRIMARY CARE: JACKSON OLIVEIRA  ***Signed***  --------------------------------------------------------------------------------------------------------------------  Chief Complaint      Encounter Date      May 24, 2021            Primary Care Provider      JACKSON OLIVEIRA            Patient Complaint      Patient is complaining of      new pt with pulmonary embolism, dyspnea            VITALS      Height 5 ft 0 in / 152.4 cm      Weight 285 lbs  / 129.733590 kg      BSA 2.17 m2      BMI 55.7 kg/m2      Temperature 97.9 F / 36.61 C - Temporal      Pulse 101      Respirations 18      Blood Pressure 116/54 Sitting, Right Arm      Pulse Oximetry 93%, room air            HPI      Patient: ALEXIS APODACA   Acct: #[id number]   Report: #[id number]            UNIT #: [id number]    DOS:       LOCATION:[facility]     : 1955            PROVIDERS      ADMITTING:     FAMILY:  JACKSON OLIVEIRA         OTHER:       DICTATING:  Vimal Chen DO            REASON FOR VISIT:  DYSPNEA            *******Signed******                                    [facility]                          Health Information Management Services                            [place], [place]  [address]               __________________________________________________________________________             Patient Name:                   Attending Physician:      Alexis Apodaca APRN             Patient Visit # MR #            Admit Date  Disch Date     Location      [id number]    [id number]      2020                 [facility] -             Date of Birth      1955      __________________________________________________________________________      0821 - DIAGNOSTIC REPORT             PULMONARY FUNCTION TEST             SPIROMETRY:      FEV1/FVC ratio is  75%.      FEV1 is 80% of predicted.      Forced vital capacity is 82% of predicted.             LUNG VOLUMES:      Total lung capacity is 95% of predicted.      Residual volume is 102% of predicted.             DIFFUSION:      DLCO is 74% of predicted.             IMPRESSION:      1.  Spirometry shows no evidence of obstruction.      2.  No response to bronchodilator therapy seen.      3.  Lung volumes show no restriction.      4.  Diffusion capacity is normal.             To be electronically signed in [employer]      [id number] ANT URIOSTEGUI D.O.            This is a 65-year-old very pleasant female and wheelchair and on oxygen,     accompanied by her family.  As per complaint, apparently the patient had     pulmonary embolism and they remove the blood clots and later they found that the    patient has hypoxemia hence the patient is here.      Patient had the echocardiography which showed mild left ventricular hypertrophy     with ejection fraction of 55 to 60%.  Patient never worked, never smoked.  Has 5    dogs and 4 cats in the house.  Denies of having any sleep problems or never had     any alpha-1 testing done.      We will try to obtain the medical records for a reasons for patient is coming     here and also called the previous hospital service for the history and physical     surgical information and the discharge summary and the x-rays.  Unfortunately     were not able to get anything from the previous institution as of today.      The patient family do not have any obvious information or any peppers.  Medical     history, management, review the systems and the social history is done as noted.     Patient denies of having any fever chills sweating or any exposed to anybody     who is sick around her.  Denied any loss of weight or any hemoptysis.            ROS      Constitutional:  Denies: Fatigue, Fever, Weight gain, Weight loss, Chills,     Insomnia, Other      Respiratory/Breathing:  Complains of:  Shortness of air; Denies: Wheezing, Cough,    Hemoptysis, Pleuritic pain, Other      Endocrine:  Denies: Polydipsia, Polyuria, Heat/cold intolerance, Abnorml     menstrual pattern, Diabetes, Other      Eyes:  Denies: Blurred vision, Vision Changes, Other      Ears, nose, mouth, throat:  Denies: Mouth lesions, Thrush, Throat pain,     Hoarseness, Allergies/Hay Fever, Post Nasal Drip, Headaches, Recent Head Injury,    Nose Bleeding, Neck Stiffness, Thyroid Mass, Hearing Loss, Ear Fullness, Dry     Mouth, Nasal or Sinus Pain, Dry Lips, Nasal discharge, Nasal congestion, Other      Cardiovascular:  Denies: Palpitations, Syncope, Claudication, Chest Pain, Wake     up Gasping for air, Leg Swelling, Irregular Heart Rate, Cyanosis, Dyspnea on     Exertion, Other      Gastrointestinal:  Denies: Nausea, Constipation, Diarrhea, Abdominal pain,     Vomiting, Difficulty Swallowing, Reflux/Heartburn, Dysphagia, Jaundice,     Bloating, Melena, Bloody stools, Other      Genitourinary:  Denies: Urinary frequency, Incontinence, Hematuria, Urgency,     Nocturia, Dysuria, Testicular problems, Other      Musculoskeletal:  Denies: Joint Pain, Joint Stiffness, Joint Swelling, Myalgias,    Other      Hematologic/lymphatic:  DENIES: Lymphadenopathy, Bruising, Bleeding tendencies,     Other      Neurological:  Denies: Headache, Numbness, Weakness, Seizures, Other      Psychiatric:  Denies: Anxiety, Appropriate Effect, Depression, Other      Sleep:  No: Excessive daytime sleep, Morning Headache?, Snoring, Insomnia?, Stop    breathing at sleep?, Other      Integumentary:  Denies: Rash, Dry skin, Skin Warm to Touch, Other      Immunologic/Allergic:  Denies: Latex allergy, Seasonal allergies, Asthma,     Urticaria, Eczema, Other      Immunization status:  No: Up to date            FAMILY/SOCIAL/MEDICAL HX      Surgical History:  Yes: Orthopedic Surgery (RIGHT CLUB FOOT REPAIR)      Stroke - Family Hx:  Mother, Grandparent      Heart - Family Hx:   Mother, Grandparent, Uncle      Diabetes - Family Hx:  Sister      Is Father Still Living?:  No      Is Mother Still Living?:  No      Social History:  No Tobacco Use, No Alcohol Use, No Recreational Drug use      Smoking status:  Never smoker      Medical History:  Yes: Arthritis (GENERALIZED), Chemotherapy/Cancer (SKIN CANCER    REMOVED FROM RIGHT UPPER ARM), Deafness or Ringing Ears (LEFT EAR), Depression,     Anxiety, Shortness Of Breath (SOA EASILY - USES AN ALBUTEROL INHALER); No: Blood    Disease, Hemorrhoids/Rectal Prob, Miscellaneous Medical/oth      Psychiatric History      anxiety and depression            PREVENTION      Hx Influenza Vaccination:  No      Influenza Vaccine Declined:  Yes      2 or More Falls in Past Year?:  No      Fall Past Year with Injury?:  No      Hx Pneumococcal Vaccination:  No      Encouraged to follow-up with:  PCP regarding preventative exams.      Chart initiated by      Elisabet Vann CMA            ALLERGIES/MEDICATIONS      Allergies:        Coded Allergies:             NO KNOWN ALLERGIES (Unverified , 5/24/21)      Medications    Last Reconciled on 5/24/21 15:56 by Alfie Conroy MDI-Albuterol (Proair HFA) 8.5 Gm Hfa.aer.ad      1 PUFFS INH Q6H PRN for SHORTNESS OF BREATH, #1 MDI 0 Refills         Reported         9/30/20       Cholecalciferol (Vitamin D3) (Vitamin D3) 5,000 Units Capsule      5000 UNITS PO QDAY for 30 Days, #30 CAP         Reported         9/30/20       (Horse [place]) 300  No Conflict Check      300 MG BID         Reported         9/30/20       Cyclobenzaprine Hcl (Cyclobenzaprine*) 10 Mg Tablet      10 MG PO QDAY PRN for MUSCLE SPASMS, TAB 0 Refills         Reported         9/30/20       Furosemide (Lasix) 20 Mg Tablet      20 MG PO QDAY, #30 TAB 0 Refills         Reported         9/30/20       (Ferrous Sulfate 65MG) 65 TAB No Conflict Check      65 MG QDAY         Reported         9/30/20       predniSONE (predniSONE) 20 Mg Tablet      20  MG PO QDAY, TAB 0 Refills         Reported         9/30/20       Alendronate Sodium (Alendronate) 70 Mg Tablet      70 MG PO [place], #4 TAB         Reported         9/30/20      Current Medications      Current Medications Reviewed 5/24/21            EXAM      Patient is alert and oriented and comfortable at rest.  Unable to obtain any     information further.  Patient has significantly decreased hearing.      Vtials      Vitals:             Height 5 ft 0 in / 152.4 cm           Weight 285 lbs  / 129.656281 kg           BSA 2.17 m2           BMI 55.7 kg/m2           Temperature 97.9 F / 36.61 C - Temporal           Pulse 101           Respirations 18           Blood Pressure 116/54 Sitting, Right Arm           Pulse Oximetry 93%, room air            Assessment      Hypoxemia - R09.02            Pulmonary embolism         Pulmonary embolism, unspecified chronicity, unspecified pulmonary embolism        type, unspecified whether acute cor pulmonale present - I26.99         Pulmonary embolism type: unspecified         Chronicity: unspecified         Acute cor pulmonale presence: unspecified            Depression         Depression, unspecified depression type - F32.9         Depression Type: unspecified            Anxiety - F41.9            Arthritis - M19.90            Morbid obesity with BMI of 50.0-59.9, adult - E66.01, Z68.43            Hearing loss         Hearing loss, unspecified hearing loss type, unspecified laterality - H91.90         Hearing loss type: unspecified         Laterality: unspecified laterality            Notes      We are not able to get any information from the previous institution and for the    reason for referral to our office.  No prior documentation is available.  We     will try to contact the institution but not able to get any information at this     time.  We will continue to try to get the information.  And will reschedule the     appointment after getting the patient history as available  and decide further     for further management and will examine the patient at the time.      We will consider getting the pulmonary function testing and the 6-minute walk     and the CT scan of the chest if necessary.            Electronically signed by ANA HUTSON  05/25/2021 08:27       Disclaimer: Converted document may not contain table formatting or lab diagrams. Please see [employer] for the authenticated document.

## 2021-06-01 RX ORDER — POTASSIUM CHLORIDE 20 MEQ/1
20 TABLET, EXTENDED RELEASE ORAL 2 TIMES DAILY
COMMUNITY
End: 2021-08-16 | Stop reason: SDUPTHER

## 2021-06-01 RX ORDER — ALENDRONATE SODIUM 70 MG/1
70 TABLET ORAL
COMMUNITY
End: 2021-08-16 | Stop reason: SDUPTHER

## 2021-06-01 RX ORDER — FERROUS SULFATE TAB EC 324 MG (65 MG FE EQUIVALENT) 324 (65 FE) MG
324 TABLET DELAYED RESPONSE ORAL 2 TIMES DAILY
COMMUNITY
End: 2021-08-16 | Stop reason: SDUPTHER

## 2021-06-01 RX ORDER — HYDROXYZINE HYDROCHLORIDE 25 MG/1
25 TABLET, FILM COATED ORAL 3 TIMES DAILY
COMMUNITY
End: 2021-08-10

## 2021-06-01 RX ORDER — FUROSEMIDE 40 MG/1
80 TABLET ORAL 2 TIMES DAILY
COMMUNITY
End: 2021-08-16 | Stop reason: SDUPTHER

## 2021-06-01 RX ORDER — ASPIRIN 81 MG/1
81 TABLET ORAL DAILY
COMMUNITY
End: 2021-08-16 | Stop reason: SDUPTHER

## 2021-06-01 RX ORDER — CYCLOBENZAPRINE HCL 10 MG
10 TABLET ORAL 2 TIMES DAILY PRN
COMMUNITY
End: 2021-08-16 | Stop reason: SDUPTHER

## 2021-06-05 NOTE — PROGRESS NOTES
Progress Note      Patient Name: Will Apodaca   Patient ID: [id number]   Sex: Female   YOB: 1955    Primary Care Provider: Ruiz KHAN   Referring Provider: Ruiz KHAN    Visit Date: May 17, 2021    Provider: ERIN Bello   [place] [facility] [place] Address: [address] [place] [address], [address] [place] [address]  [id number]   Location Phone: [phone]          Chief Complaint  · F/U  · Infection in right foot      History Of Present Illness  Will Apodaca is a 65 year old /White female who presents for evaluation and treatment of:      Patient is a 65-year-old female who comes in today with infection right foot cellulitis and follow-up after being discharged from the hospital.  Patient was admitted to [facility] beginning of the month was diagnosed with sepsis secondary to cellulitis and wound of right foot, sent out on doxycycline.  Patient has her skin disease Pemphigus foliaceous.  She has been seen by infectious disease along with dermatology she is currently on methotrexate, high-dose steroids, and is getting infusions for this disorder.    Over the last 24 hours pain, she states that there has been some mild increase in the swelling erythema and drainage has remained the same since discharge from the hospital.  Patient was never given wound care to follow-up with.  She denies any fever, or chills.  She states it is very tender to palpation.       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 --    Tinnitus --  --          Past Surgical History  Procedure Name Date Notes   Correction of right clubfoot --  --          Medication List  Name Date Started Instructions   Acidophilus oral capsule  03/31/2021 take 1 capsule by oral route 2 times a day for 90 days   Advair -21 mcg/actuation inhalation HFA aerosol inhaler 03/31/2021 inhale 2 puffs by inhalation route 2 times per day in the morning and evening for 30 days   albuterol sulfate 90 mcg/actuation inhalation HFA aerosol inhaler 03/31/2021 inhale 2 puffs (180 mcg) by inhalation route every 4-6 hours as needed for 30 days   ascorbic acid (vitamin C) 250 mg oral tablet 03/31/2021 take 2 tablets by oral route QD with iron   aspirin 81 mg oral tablet,delayed release (DR/EC) 03/31/2021 take 1 tablet (81 mg) by oral route once daily for 90 days   betamethasone dipropionate 0.05 % topical ointment 08/15/2019 apply a thin layer to the affected area(s) by topical route 2 times per day for 14 days   CellCept 500 mg oral tablet  take 2 tablets (1,000 mg) by oral route 2 times per day   cholecalciferol (vitamin D3) 10 mcg (400 unit) oral tablet  take 1 tablet by oral route daily   cyclobenzaprine 10 mg oral tablet 01/15/2021 take 1 tablet (10 mg) by oral route once daily at bedtime for 90 days   Eliquis 5 mg oral tablet 05/10/2021 take 1 tablet (5 mg) by oral route 2 times per day for 90 days   ferrous sulfate 325 mg (65 mg iron) oral tablet 03/31/2021 take 1 tablet (325 mg) by oral route once daily for 90 days   fluticasone propionate 50 mcg/actuation nasal spray,suspension 01/15/2021 spray 1 spray (50 mcg) in each nostril by intranasal route 2 times per day for nasal congestion and allergy symptoms   folic acid 0.8 mg oral capsule 03/31/2021 take 1 capsule by oral route daily for 90 days   folic acid 1 mg oral tablet 04/27/2021 take 1 tablet by oral route 2 times a day for 90 days   Fosamax 70 mg oral tablet 03/31/2021 take 1 tablet (70 mg) PO weekly in the morning, at least 30 min before first food, beverage, or medication of day   hydroxyzine HCl 25 mg oral tablet 03/31/2021 take 1 tablet (25 mg) by oral route 3 times per day as needed for 30 days    insulin lispro 100 unit/mL subcutaneous insulin pen  inject by subcutaneous route 3 units with meals   Lantus U-100 Insulin 100 unit/mL subcutaneous solution  inject 9 units by subcutaneous route daily   Lasix 80 mg oral tablet 04/14/2021 take 1 tablet (80 mg) by oral route 2 times per day for 30 days   miconazole nitrate 2 % topical aerosol powder  apply spray to the affected area(s) by topical route 2 times per day in the morning and evening   mupirocin 2 % topical ointment  apply a small amount to the affected area by topical route 2 times per day   pantoprazole 40 mg oral tablet,delayed release (DR/EC) 03/31/2021 take 1 tablet (40 mg) by oral route once daily for 90 days   prednisone 20 mg oral tablet  take 4 tablets by oral route daily   sulfamethoxazole-trimethoprim 200-40 mg/5 mL oral suspension 05/17/2021 take 20 milliliters by oral route 2 times a day for 10 days   tramadol 50 mg oral tablet 03/31/2021 take 1 tablet (50 mg) by oral route every 4 hours as needed for 30 days   Ventolin HFA 90 mcg/actuation inhalation HFA aerosol inhaler  inhale 2 puffs (180 mcg) by inhalation route every 6 hours as needed   Xanax 0.25 mg oral tablet 03/31/2021 take 1 tablet by oral route 2 times a day for 30 days   zinc 50 mg oral tablet  take 1 tablet by oral route daily         Allergy List  Allergen Name Date Reaction Notes   NO KNOWN DRUG ALLERGIES --  --  --        Allergies Reconciled  Family Medical History  Disease Name Relative/Age Notes   Family history of stroke Sister/40   --    Family history of heart disease Father/  Mother/  Sister/40   --          Social History  Finding Status Start/Stop Quantity Notes   Alcohol Never --/-- --  --    Tobacco Never --/-- --  --          Immunizations  NameDate Admin Mfg Trade Name Lot Number Route Inj VIS Given VIS Publication   InfluenzaRefused 09/17/2020 NE Not Entered  NE NE     Comments:          Review of Systems  · Constitutional  o Denies  o : fever, fatigue, weight  "loss, weight gain  · HENT  o Denies  o : headaches, vertigo, lightheadedness  · Cardiovascular  o Denies  o : lower extremity edema, claudication, chest pressure, palpitations  · Respiratory  o Denies  o : shortness of breath, wheezing, cough, hemoptysis, dyspnea on exertion  · Gastrointestinal  o Denies  o : nausea, vomiting, diarrhea, constipation, abdominal pain  · Integument  o Admits  o : rash, itching, pigmentation changes, drainage, pain, warmth  o Denies  o : hair growth change, new skin lesions  · Musculoskeletal  o Denies  o : joint pain, joint swelling, muscle pain  · Psychiatric  o Denies  o : anxiety, depression, suicidal ideation, homicidal ideation      Vitals  Date Time BP Position Site L\R Cuff Size HR RR TEMP (F) WT  HT  BMI kg/m2 BSA m2 O2 Sat FR L/min FiO2 HC       05/17/2021 03:04 /84 Sitting    91 - R  98.1 299lbs 0oz 5'  4\" 51.32 2.47 98 %            Physical Examination  · Constitutional  o Appearance  o : well-nourished, well developed, in no acute distress  · Eyes  o Conjunctivae  o : conjunctivae normal, no exudates present  o Sclerae  o : sclerae white  o Pupils and Irises  o : pupils equal and round, and reactive to light and accomodation bilaterally  o Eyelids/Ocular Adnexae  o : extra ocular movements intact  · Respiratory  o Respiratory Effort  o : breathing unlabored, no accessory muscle use  o Inspection of Chest  o : normal appearance, no retractions  o Auscultation of Lungs  o : normal breath sounds bilaterally  · Cardiovascular  o Heart  o :   § Auscultation of Heart  § : regular rate and rhythm, no murmurs, gallops or rubs  o Peripheral Vascular System  o :   § Extremities  § : no edema  · Skin and Subcutaneous Tissue  o General Inspection  o : bilateral lower extremities: Right lower extremity, erythema edema's, drainage to ulcerated wounds 1 lower lateral second 1 just proximal and lateral as well. Left lower extremity: Erythema, edema, pronounced swelling right lower " foot with a ulceration anterior foot with mild drainage, painful with palpation.  · Neurologic  o Mental Status Examination  o :   § Orientation  § : alert and oriented x3  § Speech/Language  § : normal speech pattern  o Gait and Station  o : normal gait, able to stand without difficulty  · Psychiatric  o Judgement and Insight  o : judgment and insight intact, judgement for everyday activities and social situations within normal limits, insight intact  o Thought Processes  o : rate of thoughts normal, thought content logical  o Mood and Affect  o : mood normal, affect appropriate              Assessment  · Lower extremity edema     782.3/R60.0  · Wound cellulitis     682.9/L03.90  Will start on doxycycline, do wound culture and sensitivity. Patient was set up with an appointment at wound care center at [facility] at 830 Wednesday morning 5/19/2021.      Plan  · Orders  o CBC with Auto Diff H (32382) - 782.3/R60.0, 682.9/L03.90 - 05/17/2021  o CMP [facility] (59827) - 782.3/R60.0, 682.9/L03.90 - 05/17/2021  o CRP (02421) - 782.3/R60.0, 682.9/L03.90 - 05/17/2021  o IM - Injection Fee [facility] (56694) - - 05/17/2021  o ACO-39: Current medications updated and reviewed (1159F, ) - - 05/17/2021  o 4.00 - Rocephin 1 gram mixed with Lidocaine (-6) - - 05/17/2021   Injection - Rocephin 1 Gram; Dose: 1 Gram; Site: Left Gluteus; Route: intramuscular; Date: 05/17/2021 15:59:59; Exp: 03/01/2022; Lot: [id number]; Mfg: [employer]; TradeName: ceftriaxone; Location: [facility]; Administered By: Delmis Tatum MA; Comment: Pt tolerated well, stable condition  o Wound Culture with Sensitivities if indicated [facility] (19724) - 682.9/L03.90 - 05/17/2021  · Medications  o sulfamethoxazole-trimethoprim 400-80 mg/5 mL intravenous solution   SIG: inject 10 milliliters by intravenous route 2 times a day for 10 days   DISP: (200) Milliliter with 0 refills  Prescribed on 05/17/2021     · Instructions  o Take all  medications as prescribed/directed.  o Patient was educated/instructed on their diagnosis, treatment and medications prior to discharge from the clinic today.  o Call the office with any concerns or questions.            Electronically Signed by: ERIN Bello - on May 19, 2021 07:34:51 AM

## 2021-06-05 NOTE — PROGRESS NOTES
Progress Note      Patient Name: Will Apodaca   Patient ID: [id number]   Sex: Female   YOB: 1955    Primary Care Provider: Ruiz KHAN   Referring Provider: Ruiz KHAN    Visit Date: May 13, 2021    Provider: ERIN Moss   Location: [facility]   Location Address: [address] [place] [address], [address] [place] [address]  [id number]   Location Phone: [phone]          Chief Complaint  · sore throat      History Of Present Illness  Will Apodaca is a 65 year old /White female who presents for evaluation and treatment of:      Presents today for an acute visit for sore throat.  She woke up last night with a sore throat.  She also reports having difficulty swallowing.  Denies shortness of breath.  She is able to drink water.  She has not eaten any food today.  She was recently discharged from the hospital on 5/4/2021 for sepsis, cellulitis of both lower extremities, and a history of pemphigus foliaceous.  She was discharged home on doxycycline 100 mg twice daily for 2 weeks.  Denies fever and chills.  Her lower extremities are wrapped in Ace bandages.       Past Medical History  Anxiety; Broken Bones; Cellulitis; CHF (congestive heart failure); Diabetes; Hearing loss; Hypercalcemia; Pemphigus foliaceous; Tinnitus         Past Surgical History  Correction of right clubfoot         Medication List  Acidophilus oral capsule; Advair -21 mcg/actuation inhalation HFA aerosol inhaler; albuterol sulfate 90 mcg/actuation inhalation HFA aerosol inhaler; ascorbic acid (vitamin C) 250 mg oral tablet; aspirin 81 mg oral tablet,delayed release (DR/EC); betamethasone dipropionate 0.05 % topical ointment; cholecalciferol (vitamin D3) 10 mcg (400 unit) oral tablet; cyclobenzaprine 10 mg oral tablet; Eliquis 5 mg oral tablet; ferrous sulfate 325 mg (65 mg iron) oral tablet; fluticasone propionate 50 mcg/actuation nasal spray,suspension; folic acid 0.8 mg oral  "capsule; folic acid 1 mg oral tablet; Fosamax 70 mg oral tablet; hydroxyzine HCl 25 mg oral tablet; insulin lispro 100 unit/mL subcutaneous insulin pen; Lantus U-100 Insulin 100 unit/mL subcutaneous solution; Lasix 80 mg oral tablet; methotrexate sodium 2.5 mg oral tablet; miconazole nitrate 2 % topical aerosol powder; mupirocin 2 % topical ointment; pantoprazole 40 mg oral tablet,delayed release (DR/EC); prednisone 20 mg oral tablet; tramadol 50 mg oral tablet; Ventolin HFA 90 mcg/actuation inhalation HFA aerosol inhaler; Xanax 0.25 mg oral tablet; zinc 50 mg oral tablet         Allergy List  NO KNOWN DRUG ALLERGIES         Family Medical History  Family history of stroke; Family history of heart disease         Social History  Alcohol (Never); Tobacco (Never)         Immunizations  Name Date Admin   Influenza Refused   Influenza Refused         Review of Systems  · Constitutional  o Denies  o : fatigue, night sweats  · Eyes  o Denies  o : double vision, blurred vision  · HENT  o Admits  o : sore throat  o Denies  o : headaches, vertigo, lightheadedness, recent head injury, sinus pain, nasal congestion, postnasal drip, ear pain, ear fullness  · Cardiovascular  o Denies  o : chest pain, irregular heart beats  · Respiratory  o Denies  o : shortness of breath, productive cough  · Gastrointestinal  o Denies  o : nausea, vomiting, diarrhea, constipation, abdominal pain  · Genitourinary  o Denies  o : dysuria, urinary retention      Vitals  Date Time BP Position Site L\R Cuff Size HR RR TEMP (F) WT  HT  BMI kg/m2 BSA m2 O2 Sat FR L/min FiO2        05/13/2021 04:26 /80 Sitting    100 - R  98.1  5'  4\"   97 %            Physical Examination  · Constitutional  o Appearance  o : obese, alert, in no acute distress  · Head and Face  o Head  o :   § Inspection  § : atraumatic, normocephalic  o Face  o :   § Inspection  § : Several sores on face including above eyelids forehead  · Eyes  o Conjunctivae  o : conjunctivae " normal  o Sclerae  o : sclerae white  o Pupils and Irises  o : pupils equal and round, pupils reactive to light bilaterally  o Eyelids/Ocular Adnexae  o : eyelid appearance normal  · Ears, Nose, Mouth and Throat  o Ears  o :   § External Ears  § : appearance within normal limits, no lesions present  o Nose  o :   § External Nose  § : appearance normal  o Oral Cavity  o :   § Oral Mucosa  § : oral mucosa normal  § Lips  § : lip appearance normal  § Teeth  § : normal dentition for age  § Gums  § : gums pink, non-swollen, no bleeding present  § Tongue  § : tongue appearance normal  § Palate  § : hard palate normal, soft palate appearance normal  o Throat  o : Tonsils +1, erythema, no exudate  · Neck  o Inspection/Palpation  o : normal appearance, no masses or tenderness, trachea midline  o Thyroid  o : gland size normal, nontender, no nodules or masses present on palpation  · Respiratory  o Respiratory Effort  o : breathing unlabored  o Auscultation of Lungs  o : normal breath sounds  · Cardiovascular  o Heart  o :   § Auscultation of Heart  § : regular rate, normal rhythm, no murmurs present  o Peripheral Vascular System  o :   § Extremities  § : no edema  · Lymphatic  o Neck  o : no lymphadenopathy   o Supraclavicular Nodes  o : no supraclavicular nodes          Results  · In-Office Procedures  o Lab procedure  § Rapid strep screen (40615)   § Beta Strep Gp A Culture: Negative   § Internal Control Verified?: Yes       Assessment  · Pharyngitis, acute     462/J02.9  Strep is negative. Discussed symptomatic treatment including throat lozenges and warm fluids. If symptoms worsen or develops fever follow-up in office. Schedule an appointment next Monday for an IPF with Tiarra KHAN.      Plan  · Orders  o ACO-39: Current medications updated and reviewed (1159F, ) - - 05/13/2021  · Instructions  o Patient was educated/instructed on their diagnosis, treatment and medications prior to discharge from the clinic  today.  o Patient instructed to seek medical attention urgently for new or worsening symptoms.  o Call the office with any concerns or questions.  · Disposition  o Call or Return if symptoms worsen or persist.  o f/u 1 week            Electronically Signed by: ERIN Moss -Author on May 14, 2021 07:23:14 AM

## 2021-06-11 ENCOUNTER — OFFICE VISIT (OUTPATIENT)
Dept: CARDIOLOGY | Facility: CLINIC | Age: 66
End: 2021-06-11

## 2021-06-11 VITALS
DIASTOLIC BLOOD PRESSURE: 54 MMHG | BODY MASS INDEX: 55.66 KG/M2 | HEIGHT: 60 IN | SYSTOLIC BLOOD PRESSURE: 110 MMHG | HEART RATE: 96 BPM

## 2021-06-11 DIAGNOSIS — I50.32 CHRONIC DIASTOLIC CONGESTIVE HEART FAILURE (HCC): Primary | ICD-10-CM

## 2021-06-11 DIAGNOSIS — I10 ESSENTIAL HYPERTENSION: ICD-10-CM

## 2021-06-11 PROCEDURE — 99213 OFFICE O/P EST LOW 20 MIN: CPT | Performed by: INTERNAL MEDICINE

## 2021-06-11 NOTE — PROGRESS NOTES
Name: Will Apodaca    Date: 2021  MRN:  [id number]  :  1955      REFERRING/PRIMARY PROVIDER:  No ref. provider found    Chief Complaint   Patient presents with   • Follow-up       HPI: Will Apdoaca is a 65 y.o. female who presents today for follow-up of obesity, diastolic heart failure, volume overload hypertension. Caprice has been in and out of the hospital with pemphigus foliaceous. Autoimmune skin condition. Last visit she was significantly volume overloaded, with twice a day Lasix 80 mg this seems to be better controlled. She is about 26 pounds lighter than last visit. She had one episode of chest pain while hospitalized earlier this year for this has not been a recurrent pattern.    Past Medical History:   Diagnosis Date   • Anxiety    • Broken bones     LEFT ANKLE   • Cellulitis    • CHF (congestive heart failure) (CMS/[facility])    • Diabetes (CMS/[facility])    • Hearing loss     WEARS HEARING AIDS   • Hypercalcemia 2020   • Pemphigus foliaceous 2020   • Tinnitus        Past Surgical History:   Procedure Laterality Date   • FOOT SURGERY      CORRECTION OF RIGHT CLUBFOOT       Social History     Socioeconomic History   • Marital status:      Spouse name: Not on file   • Number of children: Not on file   • Years of education: Not on file   • Highest education level: Not on file   Tobacco Use   • Smoking status: Never Smoker   • Smokeless tobacco: Never Used   Vaping Use   • Vaping Use: Never used   Substance and Sexual Activity   • Alcohol use: Not Currently   • Sexual activity: Defer       Family History   Problem Relation Age of Onset   • Heart disease Mother    • Heart disease Father    • Stroke Sister 40   • Heart disease Sister 40        ROS:   Review of Systems   Cardiovascular: Positive for dyspnea on exertion and leg swelling.   Respiratory: Positive for shortness of breath and snoring.        No Known Allergies      Current Outpatient Medications:   •  alendronate  "(FOSAMAX) 70 MG tablet, Take 70 mg by mouth Every 7 (Seven) Days., Disp: , Rfl:   •  aspirin 81 MG EC tablet, Take 81 mg by mouth Daily., Disp: , Rfl:   •  cyclobenzaprine (FLEXERIL) 10 MG tablet, Take 10 mg by mouth Daily., Disp: , Rfl:   •  ferrous sulfate 324 (65 Fe) MG tablet delayed-release EC tablet, Take 324 mg by mouth 2 (two) times a day., Disp: , Rfl:   •  furosemide (LASIX) 40 MG tablet, Take 80 mg by mouth 2 (Two) Times a Day., Disp: , Rfl:   •  hydrOXYzine (ATARAX) 25 MG tablet, Take 25 mg by mouth Daily., Disp: , Rfl:   •  PREDNISONE PO, Take  by mouth., Disp: , Rfl:   •  potassium chloride (K-DUR,KLOR-CON) 20 MEQ CR tablet, Take 20 mEq by mouth 2 (Two) Times a Day., Disp: , Rfl:     Vitals:    06/11/21 1215   BP: 110/54   BP Location: Left arm   Patient Position: Sitting   Cuff Size: Large Adult   Pulse: 96   Height: 152.4 cm (60\")     Body mass index is 55.66 kg/m².      Physical Exam  Constitutional:       Appearance: She is obese.   Cardiovascular:      Rate and Rhythm: Normal rate and regular rhythm.   Pulmonary:      Effort: Pulmonary effort is normal.      Breath sounds: Normal breath sounds.   Musculoskeletal:         General: Swelling present.   Skin:     Findings: Bruising, lesion and rash present.         Patient's Body mass index is 55.66 kg/m². indicating that she is morbidly obese (BMI > 40 or > 35 with obesity - related health condition). Obesity-related health conditions include the following: hypertension. Obesity is improving with treatment. BMI is is above average; BMI management plan is completed. We discussed portion control..              ASSESSMENT:  Encounter Diagnoses   Name Primary?   • Chronic diastolic congestive heart failure (CMS/HCC) Yes   • Essential hypertension    • BMI 50.0-59.9, adult (CMS/HCC)          PLAN:    1. Continue current diuretic regimen, potassium supplementation  2. Lower calorie intake try to slowly lose weight  3. No additional cardiac work-up needed " at this time, will follow-up in 6 months            C Oscar Kessler MD  6/11/2021    12:41 EDT

## 2021-06-21 ENCOUNTER — OFFICE VISIT (OUTPATIENT)
Dept: DIABETES SERVICES | Facility: HOSPITAL | Age: 66
End: 2021-06-21

## 2021-06-21 VITALS
HEART RATE: 96 BPM | WEIGHT: 285 LBS | DIASTOLIC BLOOD PRESSURE: 98 MMHG | HEIGHT: 60 IN | BODY MASS INDEX: 55.95 KG/M2 | OXYGEN SATURATION: 97 % | TEMPERATURE: 97.8 F | SYSTOLIC BLOOD PRESSURE: 122 MMHG

## 2021-06-21 DIAGNOSIS — E11.65 UNCONTROLLED TYPE 2 DIABETES MELLITUS WITH HYPERGLYCEMIA (HCC): Primary | ICD-10-CM

## 2021-06-21 LAB — GLUCOSE BLDC GLUCOMTR-MCNC: 356 MG/DL (ref 70–130)

## 2021-06-21 PROCEDURE — G0463 HOSPITAL OUTPT CLINIC VISIT: HCPCS | Performed by: NURSE PRACTITIONER

## 2021-06-21 PROCEDURE — 99204 OFFICE O/P NEW MOD 45 MIN: CPT | Performed by: NURSE PRACTITIONER

## 2021-06-21 PROCEDURE — 82962 GLUCOSE BLOOD TEST: CPT | Performed by: NURSE PRACTITIONER

## 2021-06-21 RX ORDER — MYCOPHENOLATE MOFETIL 500 MG/1
1000 TABLET ORAL 2 TIMES DAILY
COMMUNITY
End: 2021-08-16 | Stop reason: SDUPTHER

## 2021-06-21 NOTE — PROGRESS NOTES
Chief Complaint  Diabetes    Referred By: PRATIK Ly    Subjective          Will JOSUE Apodaca presents to [facility] for evaluation for diabetes medication management    History of Present Illness    Visit type:  an initial evaluation  Diabetes type:  Steroid-induced Hyperglycemia; the patient states she never had diabetes until she was treated with high-dose steroids for a skin disorder  Age at time of diagnosis: 62  Current diabetes status/concerns/issues: The patient indicates she has been treated with steroids for approximately 3 years due to an autoimmune skin disorder called pemphigus foliaceous.  She indicates she was taking 130 mg each day but it was decreased 2 months ago.  She has been struggling with hyperglycemia with extreme high glucose levels sometimes above 500 since she is in the steroids.  She is currently taking 80 mg of prednisone each day and they are hoping to reduce the dose further.  Patient relies heavily on the assistance of family members with her care.  She is accompanied by her niece at today's appointment who states that she and her sister help the patient to administer her insulin as well as prepare her meals.  The patient also recently experienced cellulitis due to this skin condition  Diabetes symptoms:  polydipsia  Diabetes complications:  None  Hospitalizations secondary to DM?  No  ER/911 Secondary to Dm?  No  Hypoglycemia:  None reported at this time  Hypoglycemia Symptoms:  No hypoglycemia at this time  Diabetes treatment: She is currently taking 90 units of Lantus once a day and Novolog 30 units only once a day in the morning.  She struggles with the timing of her medications because of sleep behavior as she sometimes sleeps late.  Because of this she will take it in the early afternoon versus mornings.  She also sometimes does not take the NovoLog insulin  Blood glucose device:  Meter  Blood glucose monitoring frequency:  1 -  "2  Blood glucose range/average:  300-600  Diet:  \"Eat what I want\" and drinks what she wants; has tried to switch to diet sodas unsuccessfully  Activity:  Uses a walker due to difficulty with balance and left leg swelling and knee pain      Past Medical History:   Diagnosis Date   • Anxiety    • Broken bones     LEFT ANKLE   • Cellulitis    • CHF (congestive heart failure) (CMS/[facility])    • Diabetes (CMS/[facility])    • Hearing loss     WEARS HEARING AIDS   • Hypercalcemia 08/04/2020   • Pemphigus foliaceous 08/04/2020   • Tinnitus      Past Surgical History:   Procedure Laterality Date   • CATARACT EXTRACTION Right    • ECTOPIC PREGNANCY     • FOOT SURGERY      CORRECTION OF RIGHT CLUBFOOT     Family History   Problem Relation Age of Onset   • Heart disease Mother    • Heart disease Father    • Stroke Sister 40   • Heart disease Sister 40     Social History     Socioeconomic History   • Marital status:      Spouse name: Not on file   • Number of children: Not on file   • Years of education: Not on file   • Highest education level: Not on file   Tobacco Use   • Smoking status: Never Smoker   • Smokeless tobacco: Never Used   Vaping Use   • Vaping Use: Never used   Substance and Sexual Activity   • Alcohol use: Not Currently   • Drug use: Never   • Sexual activity: Defer     No Known Allergies    Current Outpatient Medications:   •  alendronate (FOSAMAX) 70 MG tablet, Take 70 mg by mouth Every 7 (Seven) Days., Disp: , Rfl:   •  apixaban (ELIQUIS) 5 MG tablet tablet, Take 5 mg by mouth 2 (Two) Times a Day., Disp: , Rfl:   •  aspirin 81 MG EC tablet, Take 81 mg by mouth Daily., Disp: , Rfl:   •  cyclobenzaprine (FLEXERIL) 10 MG tablet, Take 10 mg by mouth Daily., Disp: , Rfl:   •  ferrous sulfate 324 (65 Fe) MG tablet delayed-release EC tablet, Take 324 mg by mouth 2 (two) times a day., Disp: , Rfl:   •  furosemide (LASIX) 40 MG tablet, Take 80 mg by mouth 2 (Two) Times a Day., Disp: , Rfl:   •  hydrOXYzine (ATARAX) 25 " MG tablet, Take 25 mg by mouth 3 (Three) Times a Day., Disp: , Rfl:   •  insulin aspart (novoLOG FLEXPEN) 100 UNIT/ML solution pen-injector sc pen, Inject 30 Units under the skin into the appropriate area as directed Daily., Disp: , Rfl:   •  insulin glargine (LANTUS, SEMGLEE) 100 UNIT/ML injection, Inject 90 Units under the skin into the appropriate area as directed Daily., Disp: , Rfl:   •  mycophenolate (CELLCEPT) 500 MG tablet, Take 1,000 mg by mouth 2 (Two) Times a Day., Disp: , Rfl:   •  potassium chloride (K-DUR,KLOR-CON) 20 MEQ CR tablet, Take 20 mEq by mouth 2 (Two) Times a Day., Disp: , Rfl:   •  PREDNISONE PO, Take 80 mg by mouth Daily., Disp: , Rfl:     Review of Systems   Constitutional: Positive for appetite change and fatigue. Negative for activity change, fever, unexpected weight gain and unexpected weight loss.   HENT: Positive for congestion, hearing loss and tinnitus. Negative for ear pain, facial swelling and sore throat.    Eyes: Positive for blurred vision. Negative for double vision, redness and visual disturbance.   Respiratory: Positive for cough. Negative for shortness of breath and wheezing.    Cardiovascular: Negative for chest pain, palpitations and leg swelling.   Gastrointestinal: Negative for abdominal distention, constipation, diarrhea, nausea, vomiting, GERD and indigestion.   Endocrine: Positive for polydipsia. Negative for polyphagia and polyuria.   Genitourinary: Negative for difficulty urinating, frequency and urgency.   Musculoskeletal: Positive for arthralgias. Negative for back pain, gait problem and myalgias.   Skin: Positive for rash, skin lesions and bruise.        autoimmune skin disorder pemphigus foliaceous   Neurological: Positive for numbness. Negative for seizures, speech difficulty, weakness, headache and confusion.   Psychiatric/Behavioral: Positive for sleep disturbance and depressed mood. Negative for stress. The patient is nervous/anxious.         Objective  "    Vitals:    06/21/21 1521   BP: 122/98   BP Location: Right arm   Patient Position: Sitting   Cuff Size: Adult   Pulse: 96   Temp: 97.8 °F (36.6 °C)   TempSrc: Temporal   SpO2: 97%   Weight: 129 kg (285 lb)   Height: 152.4 cm (60\")   PainSc: 0-No pain     Body mass index is 55.66 kg/m².      Physical Exam  Constitutional:       Appearance: Normal appearance. She is obese.      Comments: Obesity class III with BMI of 55.66   HENT:      Head: Normocephalic and atraumatic.      Right Ear: External ear normal.      Left Ear: External ear normal.      Nose: Nose normal.   Eyes:      Extraocular Movements: Extraocular movements intact.      Conjunctiva/sclera: Conjunctivae normal.   Pulmonary:      Effort: Pulmonary effort is normal.   Musculoskeletal:         General: Normal range of motion.      Cervical back: Normal range of motion.      Right lower leg: Edema (3+) present.      Left lower leg: Edema (3+) present.   Skin:     General: Skin is warm and dry.      Findings: Lesion (She has multiple lesions on her legs and arms) and rash present.   Neurological:      General: No focal deficit present.      Mental Status: She is alert and oriented to person, place, and time. Mental status is at baseline.   Psychiatric:         Mood and Affect: Mood normal.         Behavior: Behavior normal.         Thought Content: Thought content normal.         Judgment: Judgment normal.         Result Review :   The following data was reviewed by: ERIN Merritt on 06/21/2021:        Her most recent A1c was collected on 4/29/2021 was 8.1% indicating uncontrolled type 2 diabetes.  This is up from her prior result of 7.4 collected on April 4, 2021 and 7.0 collected on April 14, 2021    Most Recent A1C    HGBA1C Most Recent 4/29/21   Hemoglobin A1C 8.1 (A)   (A) Abnormal value       Comments are available for some flowsheets but are not being displayed.             A1C Last 3 Results    HGBA1C Last 3 Results 4/4/21 4/14/21 " 4/29/21   Hemoglobin A1C 7.4 (A) 7.0 (A) 8.1 (A)   (A) Abnormal value       Comments are available for some flowsheets but are not being displayed.                   Assessment:  Diagnoses and all orders for this visit:    1. Uncontrolled type 2 diabetes mellitus with hyperglycemia (CMS/[facility]) (Primary)    Other orders  -     POC Glucose        Plan: The patient was instructed to take Lantus 90 units every day in the morning around same time each day.  She is advised to take her steroid with her breakfast meal and take the 30 unit dose of NovoLog at the same time that she eats her breakfast and takes her steroid.  She is to test her blood sugar twice a day before breakfast and supper.  If her glucose levels greater than 300 at suppertime she is to take 30 units of NovoLog.  If her blood sugar is less than 300 she is to take only 20 units.      The patient will continue to monitor her blood glucose levels 2 times each day.  If she  experiences any increased frequency or severity of hypoglycemia, or worsening hyperglycemia, they will contact the office for further instructions.        Follow Up     Return in about 4 weeks (around 7/19/2021) for Medication Management.    Patient was given instructions and counseling regarding her condition or for health maintenance advice. Please see specific information pulled into the AVS if appropriate.     Eve Harry, APRN  06/21/2021

## 2021-06-21 NOTE — PATIENT INSTRUCTIONS
Take Lantus 90 units every day in the morning around same time each day.    Take steroid at this same time.  Take Novolog 30 units at the same time as breakfast and steroid dose.  Test blood sugar every morning before eating and before supper  Take Novolog 30 units with supper if BG is above 300; if below 300 take only 20 units.

## 2021-06-27 RX ORDER — INSULIN GLARGINE 100 [IU]/ML
90 INJECTION, SOLUTION SUBCUTANEOUS DAILY
COMMUNITY
End: 2021-08-16 | Stop reason: SDUPTHER

## 2021-07-13 ENCOUNTER — PATIENT OUTREACH (OUTPATIENT)
Dept: CASE MANAGEMENT | Facility: OTHER | Age: 66
End: 2021-07-13

## 2021-07-13 ENCOUNTER — TELEPHONE (OUTPATIENT)
Dept: CASE MANAGEMENT | Facility: OTHER | Age: 66
End: 2021-07-13

## 2021-07-13 DIAGNOSIS — Z79.4 OTHER SPECIFIED DIABETES MELLITUS WITH OTHER SPECIFIED COMPLICATION, WITH LONG-TERM CURRENT USE OF INSULIN (HCC): Primary | ICD-10-CM

## 2021-07-13 DIAGNOSIS — I50.9 CONGESTIVE HEART FAILURE, UNSPECIFIED HF CHRONICITY, UNSPECIFIED HEART FAILURE TYPE (HCC): ICD-10-CM

## 2021-07-13 DIAGNOSIS — E13.69 OTHER SPECIFIED DIABETES MELLITUS WITH OTHER SPECIFIED COMPLICATION, WITH LONG-TERM CURRENT USE OF INSULIN (HCC): Primary | ICD-10-CM

## 2021-07-13 DIAGNOSIS — R53.1 WEAKNESS: ICD-10-CM

## 2021-07-13 DIAGNOSIS — Z91.199 NONCOMPLIANCE: ICD-10-CM

## 2021-07-13 PROCEDURE — 99439 CHRNC CARE MGMT STAF EA ADDL: CPT | Performed by: NURSE PRACTITIONER

## 2021-07-13 PROCEDURE — 99490 CHRNC CARE MGMT STAFF 1ST 20: CPT | Performed by: NURSE PRACTITIONER

## 2021-07-13 NOTE — TELEPHONE ENCOUNTER
Pt needs home health please. I will order home health (pt, ot, nursing), please sign, thanks! [facility] - fax to [phone]

## 2021-07-13 NOTE — OUTREACH NOTE
Ambulatory Case Management Note    CCM Interim Update   Prior to call, I reviewed pt's chart. She sees many specialty doctors - pulm, cardio, sleep study, hem/onc, pcp, wound care. Pt's last A1C was 8.1 (up front last one) in April 2021. She will see Eve KHAN for endo this month. She has a recent hx of hospitalization and right foot cellulitis. She was given steroids and abx. I will f/u. Pt has a w/c and hospital bed and O2. Pt has not seen PCP since May 2021. Needs appt. Pt saw pulm once for PE, SOA and O2 use.    I called and spoke to niece, Jemima (caretaker). She stated pt lived in [place] with her, her sister and their dad. Pt is not very active. Was turned down for home health but would like it. I will enter referral. Pt is only ambulatory for bathroom and position changes. She is very [place]. Deaf in right ear and very [place] in left. Last hearing aids were bought in 80s. Saw [facility] early this year. Needs new hearing aids but cannot afford. I entered SW referral. SW updated.     Pt is on insulin but no oral meds for DM. Jemima said that pt's other niece is in charge of checking sugar and giving insulin but does not always remember. Pt needs to discuss this with PCP and [facility].    Pt's cellulitis is back but on left leg. Pt has swelling and weeping. Will see [facility] tomorrow. Pt has dogs that crawl in bed with her and scratch her legs. No major or open wound at the moment.    Pt is not currently using O2 because her O2 level is in the 90s and pulm told pt that she can hold O2 if not SOA or O2 stable.     Pt's new address will be updated.    [facility] Interim Update    [facility] to [facility] who cannot take pt due to location. I reached out to [facility] in [place] who will aceept pt based on location and insurance. Fax number is [phone]. I entered and pended referral with details to PCP to sign.      Care Evaluation    Questions/Answers      Most Recent Value   Suggested Appointments   Other (See Comment) [suggested to make appt with pcp and ENT]   Care Gaps Addressed  Diabetic A1C   HbA1c Status  Up to Date-within defined limits   HbA1c Completion at [facility] or Other  [facility]   HbA1c Comments  8.1 in April 2021   Other Patient Education/Resources   Home Healthcare   Home Healthcare Education Method  Verbal [will enter referral]   Medication Adherence  Other (see comment) [caretakers take responsibility for meds]   Healthy Lifestyle (Self-Efficacy)  other (see comments) [caretakers (neices) take responsibility for pt's care]        General & Health Literacy Assessment    Questions/Answers      Most Recent Value   Assessment Completed With  Family [neice ]   Living Arrangement  Family Members   Type of Residence  Private Residence   Home Care Services  No [referral will be entered for home health]   Equiptment Used at Home  Hosptial Bed, Walker, Wheelchair, Oxygen/Respiratory Treatment   Communication Device  Yes [needs new hearing aid]   Other Issues  Hearing Impairment   Bed or Wheelchair Confined  No   Difficulty Keeping Appointments  No   Islam or Spiritual Beliefs that Impact Treatment  No   Chronic Pain  No          Care Plan: Heart Failure   Updates made since 7/13/2021 12:00 AM      Problem: FLUID RETENTION/OVERLOAD       Goal: Patient will be able to identify signs and symptoms of fluid retention       Task: Educate patient on daily weight tracking and signs of extremity edema Completed 7/13/2021   Responsible User: Roro Moses RN      Task: Instruct patient to take weight every day and call provider for weight gain of 2 lbs overnight or 5 lbs in a week Completed 7/13/2021   Responsible User: Roro Moses RN      Task: Instruct patient to do a daily self-check for symptoms of extra fluid (shortness of breath, weakness, trouble sleeping, body swelling, or dizziness) Completed 7/13/2021   Responsible User: Roro Moses RN      Problem: HEART FAILURE MEDICATION  ADHERENCE       Goal: Consistently take heart failure medication as prescribed       Task: Emphasize the importance of medication adherence Completed 7/13/2021   Responsible User: Roro Moses RN   Note:    Pt's caretakers organize meds for pt in med planner     Task: Discuss barriers to medication adherence with patient Completed 7/13/2021   Responsible User: Roro Moses RN      Problem: EXERCISE REGIMEN       Goal: Patient will engage in physical activity safely       Task: Educate patient on benefits of exercise for heart failure Completed 7/13/2021   Responsible User: Roro Moses RN      Task: Educate patient on maintaining safety during exercise activity Completed 7/13/2021   Responsible User: Roro Moses RN      Task: Educate regarding gradually increasing activity to 20 minutes a day Completed 7/13/2021   Responsible User: Roro Moses RN      Problem: HEART FAILURE MAINTENANCE       Goal: Patient will not experience any symptoms of shortness of breath or body swelling over the next 3 months       Task: Schedule regular provider visits for close monitoring of CHF. If patient has not seen PCP within the past 6 months, schedule a follow up appointment Completed 7/13/2021   Responsible User: Roro Moses RN   Note:    Pt has appt with cardio made         Roro Moses RN  Ambulatory Case Management    7/13/2021, 11:26 EDT

## 2021-07-14 ENCOUNTER — PATIENT OUTREACH (OUTPATIENT)
Dept: CASE MANAGEMENT | Facility: OTHER | Age: 66
End: 2021-07-14

## 2021-07-14 DIAGNOSIS — I50.9 CONGESTIVE HEART FAILURE, UNSPECIFIED HF CHRONICITY, UNSPECIFIED HEART FAILURE TYPE (HCC): Primary | ICD-10-CM

## 2021-07-14 NOTE — OUTREACH NOTE
ASSESSMENT    Staff Spoke With: MSW spoke with patient's niece to discuss resources needed.    Reason for the Referral: Additional Care Services and Financial Concerns    Patient's Reported Cognitive Status: Intermittent Confusion    Does the patient have Advanced Care Planning documents?: Yes, POA, No. and No. Advanced care planning education provided.    Patient's Reported Physical Status: Needs Assistance    Patient utilizes these assistive devices and/or in home care services: Bedside Commode    Patient's  Status: Did not serve in the .    Patient's Current Living Arrangements/Home Environment: Patient lives with niece and niece is full time caretaker.    Patient's Spiritual Affiliation/Impact on Care: No spiritual affiliation noted at this time.    Patient's Behavioral Health/Substance Abuse History: No substance abuse concerns at this time.    [facility] updated and reviewed with the patient during this program:     Financial Resource Strain: High Risk   • Difficulty of Paying Living Expenses: Hard       Food Insecurity: No Food Insecurity   • Worried About Running Out of Food in the Last Year: Never true   • Ran Out of Food in the Last Year: Never true       Transportation Needs: No Transportation Needs   • Lack of Transportation (Medical): No   • Lack of Transportation (Non-Medical): No       Housing Stability: Low Risk    • Unable to Pay for Housing in the Last Year: No   • Number of Places Lived in the Last Year: 1   • Unstable Housing in the Last Year: No       Patient Outreach    MSW spoke with patient's niece on this day to discuss resources needed for hearing aid costs. MSW suggested reaching out to insurance company first to see if they will pay for portion of costs. MSW also provided patient's niece with resources via e-mail for [employer], [employer] program, and [employer] for assistance. MSW also educated patient's niece on senior services available through [employer] for in home cg  supports and needs.    Care Coordination    MSW spoke with [facility] through [facility] and they are no longer accepting patients for hearing aid assistance due to the pandemic, but MSW was given other resources for patient.       DISCUSSION AND PLAN    MSW to follow up with patient and niece following week.     Verbal consent obtained to contact/refer to the following individuals and/or agencies: anderson Dubon MSW   , Ambulatory Case Management    7/14/2021 16:09 EDT

## 2021-07-15 VITALS
DIASTOLIC BLOOD PRESSURE: 84 MMHG | HEART RATE: 91 BPM | WEIGHT: 293 LBS | OXYGEN SATURATION: 98 % | HEIGHT: 64 IN | TEMPERATURE: 98.1 F | BODY MASS INDEX: 50.02 KG/M2 | SYSTOLIC BLOOD PRESSURE: 132 MMHG

## 2021-07-15 VITALS
HEIGHT: 64 IN | HEART RATE: 100 BPM | BODY MASS INDEX: 51.33 KG/M2 | OXYGEN SATURATION: 97 % | DIASTOLIC BLOOD PRESSURE: 80 MMHG | TEMPERATURE: 98.1 F | SYSTOLIC BLOOD PRESSURE: 138 MMHG

## 2021-07-29 ENCOUNTER — PATIENT OUTREACH (OUTPATIENT)
Dept: CASE MANAGEMENT | Facility: OTHER | Age: 66
End: 2021-07-29

## 2021-07-29 DIAGNOSIS — I50.9 CONGESTIVE HEART FAILURE, UNSPECIFIED HF CHRONICITY, UNSPECIFIED HEART FAILURE TYPE (HCC): ICD-10-CM

## 2021-07-29 DIAGNOSIS — E13.69 OTHER SPECIFIED DIABETES MELLITUS WITH OTHER SPECIFIED COMPLICATION, WITH LONG-TERM CURRENT USE OF INSULIN (HCC): Primary | ICD-10-CM

## 2021-07-29 DIAGNOSIS — Z79.4 OTHER SPECIFIED DIABETES MELLITUS WITH OTHER SPECIFIED COMPLICATION, WITH LONG-TERM CURRENT USE OF INSULIN (HCC): Primary | ICD-10-CM

## 2021-07-29 NOTE — OUTREACH NOTE
[facility] End of Month Documentation    This Chronic Medical Management Care Plan for Will Apodaca, 66 y.o. female, has been monitored and managed;reviewed and a new plan of care implemented for the month of July.  A cumulative time of 62  minutes was spent on this patient record this month, including phone call with relative;electronic communication with other providers;chart review.    Regarding the patient's problems: does not have a problem list on file., the following items were addressed: medical records;medications;referrals to community service providers and any changes can be found within the plan section of the note.  A detailed listing of time spent for chronic care management is tracked within each outreach encounter.  Current medications include:  has a current medication list which includes the following prescription(s): alendronate, apixaban, aspirin, cyclobenzaprine, ferrous sulfate, furosemide, hydroxyzine, insulin aspart, insulin glargine, mycophenolate, potassium chloride, and prednisone. and the patient is reported to be patient is compliant with medication protocol,  Medications are reported to be effective.  Regarding these diagnoses, referrals were made to the following provider(s):  [facility].  All notes on chart for PCP to review.    The patient was monitored remotely for weight;activity level;blood glucose;medications.    The patient's physical needs include:  help taking medications as prescribed;needs assistance with ADLs;physical healthcare.     The patient's mental support needs include:  needs met    The patient's cognitive support needs include:  needs met    The patient's psychosocial support needs include:  continued support    The patient's functional needs include: hearing care, referral to SW entered    The patient's environmental needs include:  n/a    Care Plan overall comments:  No data recorded    Refer to previous outreach notes for more information on the areas listed  above.    Monthly Billing Diagnoses  (E13.69,  Z79.4) Other specified diabetes mellitus with other specified complication, with long-term current use of insulin (CMS/[facility])    (I50.9) Congestive heart failure, unspecified HF chronicity, unspecified heart failure type (CMS/[facility])    Medications   · Medications have been reconciled    Care Plan progress this month:      Recently Modified Care Plans Updates made since 6/28/2021 12:00 AM     Heart Failure         Problem Priority Last Modified     FLUID RETENTION/OVERLOAD --  7/13/2021 11:00 AM by Roro Moses RN              Goal Recent Progress Last Modified     Patient will be able to identify signs and symptoms of fluid retention --  7/13/2021 11:00 AM by Roro Moses RN              Task Due Date Last Modified     Educate patient on daily weight tracking and signs of extremity edema --  7/13/2021 11:01 AM by Roro Moses RN        Instruct patient to take weight every day and call provider for weight gain of 2 lbs overnight or 5 lbs in a week --  7/13/2021 11:01 AM by Roro Moses RN        Instruct patient to do a daily self-check for symptoms of extra fluid (shortness of breath, weakness, trouble sleeping, body swelling, or dizziness) --  7/13/2021 11:01 AM by Roro Moses RN              Problem Priority Last Modified     HEART FAILURE MEDICATION ADHERENCE --  7/13/2021 11:00 AM by Roro Moses RN              Goal Recent Progress Last Modified     Consistently take heart failure medication as prescribed --  7/13/2021 11:00 AM by Roro Moses RN              Task Due Date Last Modified     Emphasize the importance of medication adherence --  7/13/2021 11:01 AM by Roro Moses RN     Pt's caretakers organize meds for pt in med planner       Discuss barriers to medication adherence with patient --  7/13/2021 11:01 AM by Roro Moses RN              Problem Priority Last Modified     EXERCISE  REGIMEN --  7/13/2021 11:00 AM by Roro Moses RN              Goal Recent Progress Last Modified     Patient will engage in physical activity safely --  7/13/2021 11:00 AM by Roro Moses RN              Task Due Date Last Modified     Educate patient on benefits of exercise for heart failure --  7/13/2021 11:01 AM by Roro Moses RN        Educate patient on maintaining safety during exercise activity --  7/13/2021 11:01 AM by Roro Moses RN        Educate regarding gradually increasing activity to 20 minutes a day --  7/13/2021 11:01 AM by Roro Moses RN              Problem Priority Last Modified     HEART FAILURE MAINTENANCE --  7/13/2021 11:00 AM by Roro Moses RN              Goal Recent Progress Last Modified     Patient will not experience any symptoms of shortness of breath or body swelling over the next 3 months --  7/13/2021 11:00 AM by Roro Moses RN              Task Due Date Last Modified     Schedule regular provider visits for close monitoring of CHF. If patient has not seen PCP within the past 6 months, schedule a follow up appointment --  7/13/2021 11:02 AM by Roro Moses RN     Pt has appt with cardio made                   Instructions   · Patient was provided an electronic copy of care plan  · CCM services were explained and offered and patient has accepted these services.  · Patient has given their written consent to receive CCM services and understands that this includes the authorization of electronic communication of medical information with the other treating providers.  · Patient understands that they may stop CCM services at any time and these changes will be effective at the end of the calendar month and will effectively revocate the agreement of CCM services.  · Patient understands that only one practitioner can furnish and be paid for CCM services during one calendar month.  Patient also understands that there may be  co-payment or deductible fees in association with CCM services.  · Patient will continue with at least monthly follow-up calls with the Nurse Navigator.    Roro Moses RN  Ambulatory Case Management    7/29/2021, 11:49 EDT

## 2021-08-04 ENCOUNTER — OFFICE VISIT (OUTPATIENT)
Dept: SLEEP MEDICINE | Facility: HOSPITAL | Age: 66
End: 2021-08-04

## 2021-08-04 VITALS
BODY MASS INDEX: 53.99 KG/M2 | SYSTOLIC BLOOD PRESSURE: 131 MMHG | HEART RATE: 93 BPM | WEIGHT: 275 LBS | OXYGEN SATURATION: 95 % | DIASTOLIC BLOOD PRESSURE: 59 MMHG | HEIGHT: 60 IN

## 2021-08-04 DIAGNOSIS — G47.30 OBSERVED SLEEP APNEA: Primary | ICD-10-CM

## 2021-08-04 DIAGNOSIS — G47.10 HYPERSOMNIA: ICD-10-CM

## 2021-08-04 DIAGNOSIS — F51.04 PSYCHOPHYSIOLOGICAL INSOMNIA: ICD-10-CM

## 2021-08-04 DIAGNOSIS — R06.83 SNORING: ICD-10-CM

## 2021-08-04 DIAGNOSIS — E66.01 CLASS 3 SEVERE OBESITY DUE TO EXCESS CALORIES WITHOUT SERIOUS COMORBIDITY WITH BODY MASS INDEX (BMI) OF 50.0 TO 59.9 IN ADULT (HCC): ICD-10-CM

## 2021-08-04 DIAGNOSIS — G47.8 NON-RESTORATIVE SLEEP: ICD-10-CM

## 2021-08-04 DIAGNOSIS — Z79.52 LONG TERM SYSTEMIC STEROID USER: ICD-10-CM

## 2021-08-04 PROCEDURE — G0463 HOSPITAL OUTPT CLINIC VISIT: HCPCS

## 2021-08-04 PROCEDURE — 99204 OFFICE O/P NEW MOD 45 MIN: CPT | Performed by: INTERNAL MEDICINE

## 2021-08-04 NOTE — PROGRESS NOTES
[place] [id number], [facility]  [place]: [address] [place] [id number]  Phone: [phone]  Fax; [phone]      Will Apodaca  1955  66 y.o.  female      PCP:Ruiz Bird APRN    Type of service: Initial New Patient Office Visit  Date of service: 8/4/2021    Chief Complaint   Patient presents with   • Witnessed Apnea   • Snoring   • Fatigue   • Non-restorative Sleep   • Morning Headaches   • Insomnia       History of present illness;  Will Apodaca is a new patient for me and was seen today for sleep related problems of snoring, nonrestorative sleep and witnessed apneas. The symptoms are present for many years and they are persistent in nature.  The snoring is present in all positions and it is loud.  Has no history of prior sleep evaluation and sleep studies. Patient gives prior surgery namely tonsillectomy,     She is accompanied by her niece who is a caregiver for her.  She has multiple medical problems which includes pemphigus for which she is on immunosuppression on the high-dose steroids.  At one time she was on a high dose has Cosme 130 mg of prednisone a day now which is cut down to 20 mg.  She has significant weight gain.  She is also mentally challenged.  She does not give much history but history is obtained from her niece who stays with her.  She is noted to have snoring and witnessed apneas and swelling of feet.  She is a wheelchair-bound and has some limited mobility.    Patient gives the following sleep history.  Sleep schedule:  Bedtime: Between 3 and 4 AM  Wake time: 8 AM    Symptoms  In addition to snoring, nonrestorative sleep and witnessed apneas patient gives the following associated symptoms.  Have you ever awakened gasping for breath, coughing, choking: Yes   Change in weight,  Yes   Morning headaches  Yes   Awaken with a sore throat or dry mouth  Yes   Leg jerking at night:  Yes   Crawly feeling/urge sensation to move in the legs: Yes   Teeth  "grinding:No   Have you ever awakened at night with a sour taste or burning sensation in your chest:  No   Do you have muscle weakness with laughing or anger or sleep paralysis:  No   Have you ever felt paralyzed while going to sleep or waking up:  No   Sleepwalking, nightmares, No   Nocturia (urination at night): 3 times per night  Memory Problem:Yes     Past medical history: (Relevant to sleep medicine)  1. History of DVT and pulmonary embolism  2. Pemphigus  3. Cellulitis  4. Anxiety  5. Chronic diastolic heart failure  6. Morbid obesity  7. Multiple strokes    • Medications are reviewed by me and documented in the encounter  • Allergies reviewed and documented in encounter    Social history:  Do you drive a commercial vehicle:  No   Shift work:  No   Tobacco use:  No   Alcohol use:  0 per week  Caffeinated drinks: 2    FAMILY HISTORY (Your mother, father, brothers and sisters)  1. Diabetes mellitus  2. Heart disease  3. Hypertension  4. Stroke  5. Sleep apnea  6. COPD    REVIEW OF SYSTEMS.  Full review of systems available on the intake form which is scanned in the media tab.  The relevant positive are noted below  1. [place] Sleepiness Scale :    2. Snoring  3. Fatigue  4. Headache  5. Anxiety  6. Depression  7. Excessive thirst  8. Swollen ankles  9. Recurrent nose  10. Numbness  11. Easy bruisability  12. Skin rash  13. Nail changes      Physical exam:  Vitals:    08/04/21 1500   BP: 131/59   Pulse: 93   SpO2: 95%   Weight: 125 kg (275 lb)   Height: 152.4 cm (60\")    Body mass index is 53.71 kg/m².    HEENT: Head is atraumatic, normocephalic  Eyes: pupils are round equal and reacting to light and accommodation, conjunctiva normal  Nose: no nasal septal defects or deviation and the nasal passages are clear, no nasal polyps,  Throat: tonsils not present, tongue normal, oral airway Mallampati class 4  NECK: , trachea is in the midline, thyroid not enlarged  RESPIRATORY SYSTEM: Breath sounds are equal on both " sides, there are no wheezes   CARDIOVASULAR SYSTEM: Heart sounds are regular rhythm and jesusita rate, no edema  EXTREMITES: No cyanosis, clubbing  NEUROLOGICAL SYSTEM: Oriented x 3, no gross motor defects, gait normal      Labs reviewed.  TSH Results:  TSH    TSH 12/1/20 3/16/21 4/14/21   TSH 1.750 0.302 (A) 0.956   (A) Abnormal value       Comments are available for some flowsheets but are not being displayed.            Most Recent A1C    HGBA1C Most Recent 4/29/21   Hemoglobin A1C 8.1 (A)   (A) Abnormal value       Comments are available for some flowsheets but are not being displayed.              Assessment and plan:  · Witnessed apnea (R06.81) patient's symptoms and examination is consistent with sleep apnea (G47.30)  I have talked to the patient about the signs and symptoms of sleep apnea. In addition, I have also discussed pathophysiology of sleep apnea.  I also discussed the complications of untreated sleep apnea including effects on hypertension, diabetes mellitus and nonrestorative sleep with hypersomnia which can increase risk for motor vehicle accidents.  Untreated sleep apnea is also a risk factor for development of atrial fibrillation, pulmonary hypertension and stroke.  Discussed in detail of various testing methods including home-based and lab based sleep studies.  Based on history and physical examination the most appropriate study is split-night sleep study.  The order for the sleep study is placed in [facility].  The test will be scheduled after approval from insurance. Treatment and management will be discussed after the test is completed.  Patient was given opportunity to ask questions and all the questions were answered.  A prescription for zolpidem has been given to improve sleep efficiency.  Also patient needs somebody to stay with her  · Snoring (R06.83) snoring is the sound created by turbulent airflow vibrating upper airway soft tissue.  I have also discussed factors affecting snoring including  sleep deprivation, sleeping on the back and alcohol ingestion. To minimize snoring, patient is advised to have adequate sleep, sleep on the side and avoid alcohol and sedative medications before bedtime  · Daytime excessive sleepiness .  It was assessed with [place] Sleepiness Scale of  .  There are many causes for daytime excessive sleepiness including sleep depression, shiftwork syndrome, depression and other medical disorders including heart, kidney and liver failure.  The most serious cause of excessive sleepiness is due to neurological conditions like narcolepsy/cataplexy.  But the most common cause of excessive sleepiness is due to sleep apnea with frequent awakenings during sleep time.  I have discussed safety of driving and to remain vigilant while driving.  · Obesity class 3, with BMI Body mass index is 53.71 kg/m².. I have discussed the relationship between weight and sleep apnea.There is direct correction between weight and severity of sleep apnea.  Weight reduction is encouraged, as it is going to reduce the severity of sleep apnea. I have also discussed with the patient diet and exercise to achieve ideal body weight  · History of DVT and pulmonary embolism on anticoagulation  · Pemphigus on high-dose steroids and immunosuppression    I have also discussed with the patient the following  • Sleep hygiene: Maintaining a regular bedtime and wake time, not to watch television or work in bed, limit caffeine-containing beverages before bed time and avoid naps during the day  • Adequate amount of sleep.  Generally most people needs about 7 to 8 hours of sleep.  • Return for 31 to 90 days after PAP setup with down load..  Patient's questions were answered.        Vasyl Armas MD  Sleep Medicine.  Medical Director, [facility]  8/4/2021 ,

## 2021-08-10 RX ORDER — ALPRAZOLAM 0.25 MG/1
TABLET ORAL
Qty: 60 TABLET | Refills: 0 | Status: SHIPPED | OUTPATIENT
Start: 2021-08-10 | End: 2021-09-27 | Stop reason: SDUPTHER

## 2021-08-10 RX ORDER — APIXABAN 5 MG/1
TABLET, FILM COATED ORAL
Qty: 180 TABLET | Refills: 0 | Status: SHIPPED | OUTPATIENT
Start: 2021-08-10

## 2021-08-10 RX ORDER — HYDROXYZINE HYDROCHLORIDE 25 MG/1
TABLET, FILM COATED ORAL
Qty: 90 TABLET | Refills: 0 | Status: SHIPPED | OUTPATIENT
Start: 2021-08-10 | End: 2021-09-27 | Stop reason: SDUPTHER

## 2021-08-11 ENCOUNTER — OFFICE VISIT (OUTPATIENT)
Dept: FAMILY MEDICINE CLINIC | Facility: CLINIC | Age: 66
End: 2021-08-11

## 2021-08-11 VITALS
DIASTOLIC BLOOD PRESSURE: 68 MMHG | HEIGHT: 60 IN | OXYGEN SATURATION: 93 % | BODY MASS INDEX: 53.71 KG/M2 | SYSTOLIC BLOOD PRESSURE: 128 MMHG | HEART RATE: 99 BPM

## 2021-08-11 DIAGNOSIS — L03.116 CELLULITIS OF LEFT LOWER EXTREMITY: ICD-10-CM

## 2021-08-11 DIAGNOSIS — Z76.0 MEDICATION REFILL: ICD-10-CM

## 2021-08-11 DIAGNOSIS — H65.01 NON-RECURRENT ACUTE SEROUS OTITIS MEDIA OF RIGHT EAR: Primary | ICD-10-CM

## 2021-08-11 PROCEDURE — 99214 OFFICE O/P EST MOD 30 MIN: CPT | Performed by: NURSE PRACTITIONER

## 2021-08-11 RX ORDER — AMITRIPTYLINE HYDROCHLORIDE 25 MG/1
25 TABLET, FILM COATED ORAL NIGHTLY
Qty: 30 TABLET | Refills: 2 | Status: SHIPPED | OUTPATIENT
Start: 2021-08-11 | End: 2021-08-16 | Stop reason: SDUPTHER

## 2021-08-11 RX ORDER — MECLIZINE HYDROCHLORIDE 25 MG/1
25 TABLET ORAL 3 TIMES DAILY PRN
Qty: 30 TABLET | Refills: 1 | Status: SHIPPED | OUTPATIENT
Start: 2021-08-11

## 2021-08-11 RX ORDER — FLUTICASONE PROPIONATE 50 MCG
2 SPRAY, SUSPENSION (ML) NASAL DAILY
Qty: 9.9 ML | Refills: 2 | Status: SHIPPED | OUTPATIENT
Start: 2021-08-11

## 2021-08-11 RX ORDER — DOXYCYCLINE HYCLATE 100 MG/1
100 CAPSULE ORAL 2 TIMES DAILY
Qty: 20 CAPSULE | Refills: 0 | Status: SHIPPED | OUTPATIENT
Start: 2021-08-11 | End: 2021-08-21

## 2021-08-11 NOTE — PROGRESS NOTES
"Chief Complaint  Recurrent Skin Infections and Dizziness    Subjective     {Problem List  Visit Diagnosis   Encounters  Notes  Medications  Labs  Result Review Imaging  Media :23}     Will Apodaca presents to [facility]  She is needing medication refill on all medications.      Patient has a history of reoccurring cellulitis bilateral lower extremities.  She has a skin condition that is currently being followed by dermatology, she has infusions that she gets intermittently and is currently on oral prednisone that is being tapered down per dermatology.  Patient is also on CellCept which is also trying to be tapered down per dermatology.  This condition causes reoccurring infection bilateral lower extremities of patient's legs.  Patient and niece states that over the past week or so she has noticed some increased redness left lower extremity.  It is becoming painful.  No obvious discharge or oozing at this time.  Patient does have 1 small blister that is occurring on the leg.  This is typical of her infections that she gets with a skin condition.  Denies any fever or chills.  Denies any other systemic symptoms.        Dizziness  This is a new problem. The current episode started 1 to 4 weeks ago. The problem occurs daily. The problem has been waxing and waning. Associated symptoms include nausea, vertigo and vomiting. Pertinent negatives include no congestion or fever. Associated symptoms comments: Can last all days, felt better after sleeping. Exacerbated by: worse during the day, better at night. She has tried rest and sleep for the symptoms. The treatment provided mild relief.       Objective   Vital Signs:   /68 (BP Location: Right arm, Patient Position: Sitting, Cuff Size: Adult)   Pulse 99   Ht 152.4 cm (60\")   SpO2 93%   BMI 53.71 kg/m²     Physical Exam  Vitals reviewed.   Constitutional:       Appearance: Normal appearance. She is well-developed.   HENT: "      Head: Normocephalic and atraumatic.      Right Ear: A middle ear effusion is present. Tympanic membrane is injected, scarred and erythematous.   Eyes:      Conjunctiva/sclera: Conjunctivae normal.      Pupils: Pupils are equal, round, and reactive to light.   Cardiovascular:      Rate and Rhythm: Normal rate and regular rhythm.      Heart sounds: No murmur heard.   No friction rub. No gallop.    Pulmonary:      Effort: Pulmonary effort is normal.      Breath sounds: Normal breath sounds. No wheezing or rhonchi.   Skin:     General: Skin is warm and dry.      Findings: Erythema present.      Comments: Erythemic changes in left lower extremity, increased swelling, blister present (ruptured) increasing in pain.   Neurological:      Mental Status: She is alert and oriented to person, place, and time.   Psychiatric:         Mood and Affect: Mood and affect normal.         Behavior: Behavior normal.         Thought Content: Thought content normal.         Judgment: Judgment normal.        Result Review :   The following data was reviewed by: ERIN Ly on 08/11/2021:  Common labs    Common Labsle 5/19/21 6/30/21 8/9/21   WBC 11.35 (A) 8.95 9.25   Hemoglobin 10.4 (A) 12.9 12.2   Hematocrit 34.6 (A) 42.9 40.7   Platelets 315 251 231   (A) Abnormal value                      Assessment and Plan    Diagnoses and all orders for this visit:    1. Non-recurrent acute serous otitis media of right ear (Primary)  Comments:  We will treat with doxycycline.    2. Cellulitis of left lower extremity  Comments:  Will start on doxycycline twice a day, patient has an upcoming appointment with dermatology.    3. Medication refill    Other orders  -     doxycycline (VIBRAMYCIN) 100 MG capsule; Take 1 capsule by mouth 2 (Two) Times a Day for 10 days.  Dispense: 20 capsule; Refill: 0  -     fluticasone (Flonase) 50 MCG/ACT nasal spray; 2 sprays into the nostril(s) as directed by provider Daily.  Dispense: 9.9 mL; Refill:  2  -     meclizine (ANTIVERT) 25 MG tablet; Take 1 tablet by mouth 3 (Three) Times a Day As Needed for Dizziness.  Dispense: 30 tablet; Refill: 1  -     Discontinue: amitriptyline (ELAVIL) 25 MG tablet; Take 1 tablet by mouth Every Night.  Dispense: 30 tablet; Refill: 2  -     furosemide (LASIX) 40 MG tablet; Take 2 tablets by mouth 2 (Two) Times a Day.  Dispense: 180 tablet; Refill: 1  -     mycophenolate (CELLCEPT) 500 MG tablet; Take 2 tablets by mouth 2 (Two) Times a Day.  Dispense: 180 tablet; Refill: 1  -     amitriptyline (ELAVIL) 25 MG tablet; Take 1 tablet by mouth Every Night.  Dispense: 30 tablet; Refill: 2  -     alendronate (FOSAMAX) 70 MG tablet; Take 1 tablet by mouth Every 7 (Seven) Days for 90 days.  Dispense: 12 tablet; Refill: 1  -     aspirin 81 MG EC tablet; Take 1 tablet by mouth Daily.  Dispense: 90 tablet; Refill: 1  -     cyclobenzaprine (FLEXERIL) 10 MG tablet; Take 1 tablet by mouth 2 (Two) Times a Day As Needed for Muscle Spasms.  Dispense: 90 tablet; Refill: 1  -     ferrous sulfate 324 (65 Fe) MG tablet delayed-release EC tablet; Take 1 tablet by mouth 2 (two) times a day.  Dispense: 180 tablet; Refill: 1  -     folic acid (FOLVITE) 400 MCG tablet; Take 1 tablet by mouth 2 (two) times a day for 90 days.  Dispense: 180 tablet; Refill: 1  -     potassium chloride (K-DUR,KLOR-CON) 20 MEQ CR tablet; Take 1 tablet by mouth 2 (Two) Times a Day.  Dispense: 180 tablet; Refill: 1  -     insulin glargine (LANTUS, SEMGLEE) 100 UNIT/ML injection; Inject 90 Units under the skin into the appropriate area as directed Daily for 90 days.  Dispense: 81 mL; Refill: 1  -     insulin aspart (novoLOG FLEXPEN) 100 UNIT/ML solution pen-injector sc pen; Inject 30 Units under the skin into the appropriate area as directed Daily for 90 days.  Dispense: 27 mL; Refill: 1        Follow Up   No follow-ups on file.  Patient was given instructions and counseling regarding her condition or for health maintenance advice.  Please see specific information pulled into the AVS if appropriate.

## 2021-08-13 ENCOUNTER — PATIENT OUTREACH (OUTPATIENT)
Dept: CASE MANAGEMENT | Facility: OTHER | Age: 66
End: 2021-08-13

## 2021-08-13 DIAGNOSIS — Z79.4 OTHER SPECIFIED DIABETES MELLITUS WITH OTHER SPECIFIED COMPLICATION, WITH LONG-TERM CURRENT USE OF INSULIN (HCC): Primary | ICD-10-CM

## 2021-08-13 DIAGNOSIS — E13.69 OTHER SPECIFIED DIABETES MELLITUS WITH OTHER SPECIFIED COMPLICATION, WITH LONG-TERM CURRENT USE OF INSULIN (HCC): Primary | ICD-10-CM

## 2021-08-13 NOTE — OUTREACH NOTE
Patient Outreach    MSW spoke with patient's niece for follow up. Patient's niece states that patient is doing much better since appointment on 8/11 and treating ear infection. Patient's niece states that her hearing is also better, so at this time they are not going to pursue hearing aids. Patient's niece does have resources for those if they decide to pursue hearing aid assistance in the future. Patient's niece states that she is going to be quitting her job soon to care for patient and her sister. MSW spoke with patient's niece about [employer] waiver services and being a paid caregiver to help with lost income due. Patient's niece expressed understanding and will reach out in the future if needed.       KEVIN Smallwood   , Ambulatory Case Management    8/13/2021 09:01 EDT

## 2021-08-16 RX ORDER — INSULIN GLARGINE 100 [IU]/ML
90 INJECTION, SOLUTION SUBCUTANEOUS DAILY
Qty: 81 ML | Refills: 1 | Status: SHIPPED | OUTPATIENT
Start: 2021-08-16 | End: 2021-11-14

## 2021-08-16 RX ORDER — MYCOPHENOLATE MOFETIL 500 MG/1
1000 TABLET ORAL 2 TIMES DAILY
Qty: 180 TABLET | Refills: 1 | Status: SHIPPED | OUTPATIENT
Start: 2021-08-16

## 2021-08-16 RX ORDER — ALENDRONATE SODIUM 70 MG/1
70 TABLET ORAL
Qty: 12 TABLET | Refills: 1 | Status: SHIPPED | OUTPATIENT
Start: 2021-08-16 | End: 2021-11-14

## 2021-08-16 RX ORDER — AMITRIPTYLINE HYDROCHLORIDE 25 MG/1
25 TABLET, FILM COATED ORAL NIGHTLY
Qty: 30 TABLET | Refills: 2 | Status: SHIPPED | OUTPATIENT
Start: 2021-08-16

## 2021-08-16 RX ORDER — POTASSIUM CHLORIDE 20 MEQ/1
20 TABLET, EXTENDED RELEASE ORAL 2 TIMES DAILY
Qty: 180 TABLET | Refills: 1 | Status: SHIPPED | OUTPATIENT
Start: 2021-08-16

## 2021-08-16 RX ORDER — LANOLIN ALCOHOL/MO/W.PET/CERES
400 CREAM (GRAM) TOPICAL 2 TIMES DAILY
Qty: 180 TABLET | Refills: 1 | Status: SHIPPED | OUTPATIENT
Start: 2021-08-16 | End: 2021-11-14

## 2021-08-16 RX ORDER — FERROUS SULFATE TAB EC 324 MG (65 MG FE EQUIVALENT) 324 (65 FE) MG
324 TABLET DELAYED RESPONSE ORAL 2 TIMES DAILY
Qty: 180 TABLET | Refills: 1 | Status: SHIPPED | OUTPATIENT
Start: 2021-08-16

## 2021-08-16 RX ORDER — FUROSEMIDE 40 MG/1
80 TABLET ORAL 2 TIMES DAILY
Qty: 180 TABLET | Refills: 1 | Status: SHIPPED | OUTPATIENT
Start: 2021-08-16

## 2021-08-16 RX ORDER — CYCLOBENZAPRINE HCL 10 MG
10 TABLET ORAL 2 TIMES DAILY PRN
Qty: 90 TABLET | Refills: 1 | Status: SHIPPED | OUTPATIENT
Start: 2021-08-16

## 2021-08-16 RX ORDER — ASPIRIN 81 MG/1
81 TABLET ORAL DAILY
Qty: 90 TABLET | Refills: 1 | Status: SHIPPED | OUTPATIENT
Start: 2021-08-16

## 2021-09-13 ENCOUNTER — APPOINTMENT (OUTPATIENT)
Dept: RESPIRATORY THERAPY | Facility: HOSPITAL | Age: 66
End: 2021-09-13

## 2021-09-13 ENCOUNTER — APPOINTMENT (OUTPATIENT)
Dept: CARDIAC REHAB | Facility: HOSPITAL | Age: 66
End: 2021-09-13

## 2021-09-16 ENCOUNTER — TELEPHONE (OUTPATIENT)
Dept: FAMILY MEDICINE CLINIC | Facility: CLINIC | Age: 66
End: 2021-09-16

## 2021-09-16 ENCOUNTER — TELEPHONE (OUTPATIENT)
Dept: PULMONOLOGY | Facility: CLINIC | Age: 66
End: 2021-09-16

## 2021-09-16 NOTE — TELEPHONE ENCOUNTER
Caller: MATTEO GARCIA    Relationship to patient: Emergency Contact    Best call back number: [phone]    Patient is needing: PATIENTS JACEY CALLED STATING THE PATIENT IS NEEDING A HOSPITAL FOLLOW UP APPOINTMENT. THE PATIENT GOT DICHARGED FROM THE HOSPITAL ON 09/15/2021. I ADVISED MS SOHRT AT THE  THAT PER WORK FLOW WE DO NOT SCHEDULE AFTER 7 DAYS OF THE DISCHARGE DAY AND SHE WAS SO KIND TO TRY AND OPEN THE SCHEDULE TO SCHEDULE THE PATIENT ON 09/22/2021 BUT IT DID NOT OPEN UP ON HUBS END. THE PATIENTS NIECE WOULD LIKE A CALL BACK TO SET THAT UP PLEASE ADVISE THANK YOU.

## 2021-09-27 ENCOUNTER — OFFICE VISIT (OUTPATIENT)
Dept: FAMILY MEDICINE CLINIC | Facility: CLINIC | Age: 66
End: 2021-09-27

## 2021-09-27 VITALS
OXYGEN SATURATION: 91 % | SYSTOLIC BLOOD PRESSURE: 100 MMHG | HEIGHT: 55 IN | BODY MASS INDEX: 61.1 KG/M2 | HEART RATE: 87 BPM | RESPIRATION RATE: 15 BRPM | TEMPERATURE: 97.6 F | WEIGHT: 264 LBS | DIASTOLIC BLOOD PRESSURE: 60 MMHG

## 2021-09-27 DIAGNOSIS — Z78.9 INPATIENT HOSPITALIZATION WITHIN LAST 30 DAYS: Primary | ICD-10-CM

## 2021-09-27 DIAGNOSIS — K80.20 CALCULUS OF GALLBLADDER WITHOUT CHOLECYSTITIS WITHOUT OBSTRUCTION: ICD-10-CM

## 2021-09-27 DIAGNOSIS — F41.9 ANXIETY: ICD-10-CM

## 2021-09-27 DIAGNOSIS — R11.14 BILIOUS VOMITING WITH NAUSEA: ICD-10-CM

## 2021-09-27 DIAGNOSIS — Z79.52 LONG TERM SYSTEMIC STEROID USER: ICD-10-CM

## 2021-09-27 DIAGNOSIS — N18.31 STAGE 3A CHRONIC KIDNEY DISEASE (HCC): ICD-10-CM

## 2021-09-27 DIAGNOSIS — M79.605 PAIN IN BOTH LOWER EXTREMITIES: ICD-10-CM

## 2021-09-27 DIAGNOSIS — M79.604 PAIN IN BOTH LOWER EXTREMITIES: ICD-10-CM

## 2021-09-27 PROCEDURE — 99214 OFFICE O/P EST MOD 30 MIN: CPT | Performed by: NURSE PRACTITIONER

## 2021-09-27 RX ORDER — ALPRAZOLAM 0.25 MG/1
0.25 TABLET ORAL 3 TIMES DAILY PRN
Qty: 90 TABLET | Refills: 0 | Status: SHIPPED | OUTPATIENT
Start: 2021-09-27 | End: 2021-10-27

## 2021-09-27 RX ORDER — LANCETS 28 GAUGE
EACH MISCELLANEOUS
Qty: 200 EACH | Refills: 1 | Status: SHIPPED | OUTPATIENT
Start: 2021-09-27

## 2021-09-27 RX ORDER — CEPHALEXIN 500 MG/1
CAPSULE ORAL
COMMUNITY
Start: 2021-09-15 | End: 2021-09-27

## 2021-09-27 RX ORDER — ONDANSETRON 4 MG/1
4 TABLET, ORALLY DISINTEGRATING ORAL
COMMUNITY
Start: 2021-09-22

## 2021-09-27 RX ORDER — TRAMADOL HYDROCHLORIDE 50 MG/1
50 TABLET ORAL EVERY 6 HOURS PRN
Qty: 60 TABLET | Refills: 3 | Status: SHIPPED | OUTPATIENT
Start: 2021-09-27 | End: 2021-10-27

## 2021-09-27 RX ORDER — PANTOPRAZOLE SODIUM 40 MG/1
40 TABLET, DELAYED RELEASE ORAL DAILY
COMMUNITY

## 2021-09-27 RX ORDER — GABAPENTIN 300 MG/1
300 CAPSULE ORAL NIGHTLY
Qty: 30 CAPSULE | Refills: 1 | Status: SHIPPED | OUTPATIENT
Start: 2021-09-27

## 2021-09-27 RX ORDER — HYDROXYZINE HYDROCHLORIDE 25 MG/1
25 TABLET, FILM COATED ORAL 3 TIMES DAILY PRN
Qty: 120 TABLET | Refills: 3 | Status: SHIPPED | OUTPATIENT
Start: 2021-09-27

## 2021-09-27 RX ORDER — CEPHALEXIN 500 MG/1
1000 CAPSULE ORAL 3 TIMES DAILY
COMMUNITY
Start: 2021-09-15 | End: 2021-09-29

## 2021-09-27 NOTE — PATIENT INSTRUCTIONS
[place] Diet  A bland diet consists of foods that are often soft and do not have a lot of fat, fiber, or extra seasonings. Foods without fat, fiber, or seasoning are easier for the body to digest. They are also less likely to irritate your mouth, throat, stomach, and other parts of your digestive system. A bland diet is sometimes called a BRAT diet.  What is my plan?  Your health care provider or food and nutrition specialist (dietitian) may recommend specific changes to your diet to prevent symptoms or to treat your symptoms. These changes may include:  · Eating small meals often.  · Cooking food until it is soft enough to chew easily.  · Chewing your food well.  · Drinking fluids slowly.  · Not eating foods that are very spicy, sour, or fatty.  · Not eating citrus fruits, such as oranges and grapefruit.  What do I need to know about this diet?  · Eat a variety of foods from the bland diet food list.  · Do not follow a bland diet longer than needed.  · Ask your health care provider whether you should take vitamins or supplements.  What foods can I eat?  Grains    Hot cereals, such as cream of wheat. Rice. Bread, crackers, or tortillas made from refined white flour.  Vegetables  Canned or cooked vegetables. Mashed or boiled potatoes.  Fruits    Bananas. Applesauce. Other types of cooked or canned fruit with the skin and seeds removed, such as canned peaches or pears.  Meats and other proteins    Scrambled eggs. Creamy peanut butter or other nut butters. Lean, well-cooked meats, such as chicken or fish. Tofu. Soups or broths.  Dairy  Low-fat dairy products, such as milk, cottage cheese, or yogurt.  Beverages    Water. Herbal tea. Apple juice.  Fats and oils  Mild salad dressings. Canola or olive oil.  Sweets and desserts  Pudding. Custard. Fruit gelatin. Ice cream.  The items listed above may not be a complete list of recommended foods and beverages. Contact a dietitian for more options.  What foods are not  recommended?  Grains  Whole grain breads and cereals.  Vegetables  Raw vegetables.  Fruits  Raw fruits, especially citrus, berries, or dried fruits.  Dairy  Whole fat dairy foods.  Beverages  Caffeinated drinks. Alcohol.  Seasonings and condiments  Strongly flavored seasonings or condiments. Hot sauce. Salsa.  Other foods  Spicy foods. Fried foods. Sour foods, such as pickled or fermented foods. Foods with high sugar content. Foods high in fiber.  The items listed above may not be a complete list of foods and beverages to avoid. Contact a dietitian for more information.  Summary  · A bland diet consists of foods that are often soft and do not have a lot of fat, fiber, or extra seasonings.  · Foods without fat, fiber, or seasoning are easier for the body to digest.  · Check with your health care provider to see how long you should follow this diet plan. It is not meant to be followed for long periods.  This information is not intended to replace advice given to you by your health care provider. Make sure you discuss any questions you have with your health care provider.  Document Revised: 01/16/2019 Document Reviewed: 01/16/2019  [employer] Patient Education © 2021 [employer]  Cholecystitis    Cholecystitis is irritation and swelling (inflammation) of the gallbladder. The gallbladder is an organ that is shaped like a pear. It is under the liver on the right side of the body. This organ stores bile. Bile helps the body break down (digest) the fats in food.  This condition can occur all of a sudden. It needs to be treated.  What are the causes?  This condition may be caused by stones or lumps that form in the gallbladder (gallstones). Gallstones can block the tube (duct) that carries bile out of your gallbladder.  Other causes are:  · Damage to the gallbladder due to less blood flow.  · Germs in the bile ducts.  · Scars or kinks in the bile ducts.  · Abnormal growths (tumors) in the liver, pancreas, or  "gallbladder.  What increases the risk?  You are more likely to develop this condition if:  · You have sickle cell disease.  · You take birth control pills.   · You use estrogen.  · You have alcoholic liver disease.  · You have liver cirrhosis.  · You are being fed through a vein.  · You are very ill.  · You do not eat or drink for a long time. This is also called \"fasting.\"  · You are overweight (obese).  · You lose weight too fast.  · You are pregnant.  · You have high levels of fat in the blood (triglycerides).  · You have irritation and swelling of the pancreas (pancreatitis).  What are the signs or symptoms?  Symptoms of this condition include:  · Pain in the belly (abdomen). Pain is often in the upper right area of the belly.  · Tenderness or bloating in the belly.  · Feeling sick to your stomach (nauseous).  · Throwing up (vomiting).  · Fever.  · Chills.  How is this diagnosed?  This condition may be diagnosed with a medical history and exam. You may also have other tests, such as:  · Imaging tests. This may include:  ? Ultrasound.  ? CT scan of the belly.  ? Nuclear scan. This is also called a HIDA scan. This scan lets your doctor see the bile as it moves in your body.  ? MRI.  · Blood tests. These are done to check:  ? Your blood count. The white blood cell count may be higher than normal.  ? How well your liver works.  How is this treated?  This condition may be treated with:  · Surgery to take out your gallbladder.  · Antibiotic medicines to treat illnesses caused by germs.  · Going without food for some time.  · Giving fluids through an IV tube.  · Medicines to treat pain or throwing up.  Follow these instructions at home:  · If you had surgery, follow instructions from your doctor about how to care for yourself after you go home.  Medicines    · Take over-the-counter and prescription medicines only as told by your doctor.  · If you were prescribed an antibiotic medicine, take it as told by your doctor. " Do not stop taking it even if you start to feel better.    General instructions  · Follow instructions from your doctor about what to eat or drink. Do not eat or drink anything that makes you sick again.  · Do not lift anything that is heavier than 10 lb (4.5 kg) until your doctor says that it is safe.  · Do not use any products that contain nicotine or tobacco, such as cigarettes and e-cigarettes. If you need help quitting, ask your doctor.  · Keep all follow-up visits as told by your doctor. This is important.  Contact a doctor if:  · You have pain and your medicine does not help.  · You have a fever.  Get help right away if:  · Your pain moves to:  ? Another part of your belly.  ? Your back.  · Your symptoms do not go away.  · You have new symptoms.  Summary  · Cholecystitis is swelling and irritation of the gallbladder.  · This condition may be caused by stones or lumps that form in the gallbladder (gallstones).  · Common symptoms are pain in the belly. You may feel sick to your stomach and start throwing up. You may also have a fever and chills.  · This condition may be treated with surgery to take out the gallbladder. It may also be treated with medicines, fasting, and fluids through an IV tube.  · Follow what you are told about eating and drinking. Do not eat things that make you sick again.  This information is not intended to replace advice given to you by your health care provider. Make sure you discuss any questions you have with your health care provider.  Document Revised: 04/26/2019 Document Reviewed: 04/26/2019  [employer] Patient Education © 2021 [employer]

## 2021-09-27 NOTE — PROGRESS NOTES
Chief Complaint  Vomiting, Anorexia, and Hospital Follow Up Visit    Subjective        Social History     Socioeconomic History   • Marital status:      Spouse name: Not on file   • Number of children: Not on file   • Years of education: Not on file   • Highest education level: Not on file   Tobacco Use   • Smoking status: Never Smoker   • Smokeless tobacco: Never Used   Vaping Use   • Vaping Use: Never used   Substance and Sexual Activity   • Alcohol use: Not Currently   • Drug use: Never   • Sexual activity: Defer     Past Medical History:   Diagnosis Date   • Anxiety    • Broken bones     LEFT ANKLE   • Cellulitis    • CHF (congestive heart failure) (CMS/[facility])    • Diabetes (CMS/[facility])    • Hearing loss     WEARS HEARING AIDS   • Hypercalcemia 08/04/2020   • 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 female admitted to [facility] and  is seen for follow up.  No chief complaint on file.    Admitted 9/6/2021  Discharge 9/15/2021  Admitted at [facility]  Admitted by Saroj Tilley  No flowsheet data found.  Discharge summary is available.  Current outpatient and discharge medications have been reconciled for the patient.  Reviewed by: ERIN Ly  She was admitted for the following reason(s):  Primary admitting diagnosis at discharge was cellulitis, acute kidney injury, UTI, hypotension.  Procedures performed while hospitalized:Procedures Performed in Hospital: IV fluids, CT of abdomen and pelvis and chest x-ray  Pending results:  Pending tests: none  Pain Control:  well controlled  Diet: Gallbladder diet/bland diet  Activity after Discharge: Home health physical therapy, activity as tolerated  Items to be addressed  "at first follow up visit include:  1. Medication changes: Started on Keflex and Midodrine   Patient is still on Keflex she has not taken the Midodrine    She reports her overall condition is show no change since discharge.  Specific complaints: Nausea/vomiting/anorexia.  Social support is said to be adequate to meet her needs.   Patient needing referral to general surgery for cholecystitis and nephrology for acute kidney injury.         Nausea  This is a recurrent problem. The current episode started 1 to 4 weeks ago. The problem occurs daily. The problem has been waxing and waning. Associated symptoms include anorexia, fatigue, nausea, numbness and vomiting. Pertinent negatives include no abdominal pain or headaches. The symptoms are aggravated by eating. Treatments tried: nausea medication. The treatment provided mild relief.   Vomiting   This is a recurrent problem. The current episode started 1 to 4 weeks ago. The problem occurs less than 2 times per day. The problem has been waxing and waning. The emesis has an appearance of bile and stomach contents. There has been no fever. Pertinent negatives include no abdominal pain, diarrhea or headaches. Treatments tried: nausea medication. The treatment provided no relief.   Leg Pain   The incident occurred more than 1 week ago. There was no injury mechanism. The pain is present in the left leg and right leg. The quality of the pain is described as aching, cramping and stabbing. The pain is moderate. The pain has been constant (at bedtime) since onset. Associated symptoms include numbness and tingling. She reports no foreign bodies present. Exacerbated by: night time. She has tried rest and elevation (pain medication) for the symptoms. The treatment provided no relief.       Objective   Vital Signs:   /60 (BP Location: Right arm, Patient Position: Standing, Cuff Size: Large Adult)   Pulse 87   Temp 97.6 °F (36.4 °C) (Oral)   Resp 15   Ht 60 cm (23.62\")   Wt " 120 kg (264 lb)   SpO2 91%   .64 kg/m²     Physical Exam  Vitals reviewed.   Constitutional:       Appearance: Normal appearance. She is well-developed. She is morbidly obese. She is ill-appearing (chronically).   HENT:      Head: Normocephalic and atraumatic.   Eyes:      Conjunctiva/sclera: Conjunctivae normal.      Pupils: Pupils are equal, round, and reactive to light.   Cardiovascular:      Rate and Rhythm: Normal rate and regular rhythm.      Heart sounds: No murmur heard.     Pulmonary:      Effort: Pulmonary effort is normal.      Breath sounds: Normal breath sounds. No wheezing or rhonchi.   Abdominal:      General: Bowel sounds are normal.      Tenderness: There is no abdominal tenderness.   Skin:     General: Skin is warm and dry.   Neurological:      Mental Status: She is alert and oriented to person, place, and time.   Psychiatric:         Mood and Affect: Mood and affect normal.         Behavior: Behavior normal.         Thought Content: Thought content normal.         Judgment: Judgment normal.        Result Review :   The following data was reviewed by: ERIN Ly on 2021:  Common labs    Common Labsle 9/14/21 9/15/21 9/22/21   WBC 10.84 8.76 8.25   Hemoglobin 10.3 (A) 11.1 (A) 11.4 (A)   Hematocrit 33.9 (A) 38.3 36.6   Platelets 377 361 486 (A)   (A) Abnormal value            Data reviewed: Recent hospitalization notes Inpatient hospital and ER visit 2021     Ct Abdomen & Pelvis Wo Iv Contrast Without Oral Contrast    Result Date: 2021  REVIEWING YOUR TEST RESULTS IN [facility] IS NOT A SUBSTITUTE FOR DISCUSSING THOSE RESULTS WITH YOUR HEALTH CARE PROVIDER. PLEASE CONTACT YOUR PROVIDER VIA [facility] TO DISCUSS ANY QUESTIONS OR CONCERNS YOU MAY HAVE REGARDING THESE TEST RESULTS. RADIOLOGY REPORT FACILITY: [facility] UNIT/AGE/GENDER: SERGIO ER AGE:66 Y SEX:F PATIENT NAME/: ALEXIS BAKER LYNN 1955 UNIT NUMBER: [id number] ACCOUNT  NUMBER: [id number] ACCESSION NUMBER: [id number] EXAMINATION: CT ABDOMEN AND PELVIS WO IV CONTRAST, XR CHEST 1 VW DATE: 09/06/2021 COMPARISON: None HISTORY: Nausea/vomiting. TECHNIQUE: Non contrast helical CT of the abdomen and pelvis was performed from the suprarenal fossa to the bladder base. The lack of intravenous and oral contrast limits the sensitivity for soft tissue pathology detection. CT scans at this facility use dose modulation, iterative reconstruction and/or weight based dosing when appropriate to reduce radiation dose to as low as reasonably achievable. FINDINGS: There is elevation of the right hemidiaphragm. There is no infiltrate within the lung bases. There is mild atelectasis. Hepatic steatosis is noted. Gallstones are present. Splenic granulomas are noted. The distal esophagus appears normal. The stomach and duodenal sweep appear normal. The pancreas appears normal. The adrenal glands appear normal. The left kidney appears normal. There is a 4 mm nonobstructing right lower pole renal calculus. Small bowel loops appear normal. There is no mesenteric mass or adenopathy. There is a fat-containing umbilical hernia. Mild sigmoid diverticulosis is present. There is no evidence of diverticulitis. Large lobulated uterine fibroids are noted. The bladder appears normal. The rectum appears normal. There is no pelvic or inguinal lymphadenopathy. There is a 3 cm left ovarian cyst. IMPRESSION: 1. Cholelithiasis 2. Hepatic steatosis 3. Nonobstructing right lower pole renal calculus 4. Fibroid uterus Dictated by: Darwin Reyes M.D. Images and Report reviewed and interpreted by: Darwin Reyes M.D. <PS><Electronically signed by: Darwin Reyes M.D.> 09/06/2021 0759 D: 09/06/2021 0750 T: 09/06/2021 0750    Xr Chest 1 Vw    Result Date: 9/6/2021  REVIEWING YOUR TEST RESULTS IN [facility] IS NOT A SUBSTITUTE FOR DISCUSSING THOSE RESULTS WITH YOUR HEALTH CARE PROVIDER. PLEASE CONTACT YOUR  PROVIDER VIA [facility] TO DISCUSS ANY QUESTIONS OR CONCERNS YOU MAY HAVE REGARDING THESE TEST RESULTS. RADIOLOGY REPORT FACILITY: [facility] UNIT/AGE/GENDER: SERGIO ER AGE:66 Y SEX:F PATIENT NAME/: ALEXIS BAKER LYNN 1955 UNIT NUMBER: [id number] ACCOUNT NUMBER: [id number] ACCESSION NUMBER: [id number] EXAMINATION: CT ABDOMEN AND PELVIS WO IV CONTRAST, XR CHEST 1 VW DATE: 2021 COMPARISON: None HISTORY: Nausea/vomiting. TECHNIQUE: Non contrast helical CT of the abdomen and pelvis was performed from the suprarenal fossa to the bladder base. The lack of intravenous and oral contrast limits the sensitivity for soft tissue pathology detection. CT scans at this facility use dose modulation, iterative reconstruction and/or weight based dosing when appropriate to reduce radiation dose to as low as reasonably achievable. FINDINGS: There is elevation of the right hemidiaphragm. There is no infiltrate within the lung bases. There is mild atelectasis. Hepatic steatosis is noted. Gallstones are present. Splenic granulomas are noted. The distal esophagus appears normal. The stomach and duodenal sweep appear normal. The pancreas appears normal. The adrenal glands appear normal. The left kidney appears normal. There is a 4 mm nonobstructing right lower pole renal calculus. Small bowel loops appear normal. There is no mesenteric mass or adenopathy. There is a fat-containing umbilical hernia. Mild sigmoid diverticulosis is present. There is no evidence of diverticulitis. Large lobulated uterine fibroids are noted. The bladder appears normal. The rectum appears normal. There is no pelvic or inguinal lymphadenopathy. There is a 3 cm left ovarian cyst. IMPRESSION: 1. Cholelithiasis 2. Hepatic steatosis 3. Nonobstructing right lower pole renal calculus 4. Fibroid uterus Dictated by: Darwin Reyes M.D. Images and Report reviewed and interpreted by: Darwin Reyes M.D. <PS><Electronically  signed by: Darwin Reyes M.D.> 09/06/2021 0759 D: 09/06/2021 0750 T: 09/06/2021 0750         Current Outpatient Medications on File Prior to Visit   Medication Sig Dispense Refill   • alendronate (FOSAMAX) 70 MG tablet Take 1 tablet by mouth Every 7 (Seven) Days for 90 days. 12 tablet 1   • amitriptyline (ELAVIL) 25 MG tablet Take 1 tablet by mouth Every Night. 30 tablet 2   • aspirin 81 MG EC tablet Take 1 tablet by mouth Daily. 90 tablet 1   • cephalexin (KEFLEX) 500 MG capsule Take 1,000 mg by mouth 3 (Three) Times a Day.     • cyclobenzaprine (FLEXERIL) 10 MG tablet Take 1 tablet by mouth 2 (Two) Times a Day As Needed for Muscle Spasms. 90 tablet 1   • Eliquis 5 MG tablet tablet Take 1 tablet by mouth twice daily 180 tablet 0   • ferrous sulfate 324 (65 Fe) MG tablet delayed-release EC tablet Take 1 tablet by mouth 2 (two) times a day. 180 tablet 1   • folic acid (FOLVITE) 400 MCG tablet Take 1 tablet by mouth 2 (two) times a day for 90 days. 180 tablet 1   • furosemide (LASIX) 40 MG tablet Take 2 tablets by mouth 2 (Two) Times a Day. 180 tablet 1   • insulin glargine (LANTUS, SEMGLEE) 100 UNIT/ML injection Inject 90 Units under the skin into the appropriate area as directed Daily for 90 days. 81 mL 1   • mycophenolate (CELLCEPT) 500 MG tablet Take 2 tablets by mouth 2 (Two) Times a Day. (Patient taking differently: Take 500 mg by mouth 2 (Two) Times a Day.) 180 tablet 1   • ondansetron ODT (ZOFRAN-ODT) 4 MG disintegrating tablet Take 4 mg by mouth.     • [DISCONTINUED] ALPRAZolam (XANAX) 0.25 MG tablet Take 1 tablet by mouth twice daily (Patient taking differently: 3 (Three) Times a Day As Needed.) 60 tablet 0   • [DISCONTINUED] hydrOXYzine (ATARAX) 25 MG tablet Take 1 tablet by mouth three times daily as needed 90 tablet 0   • fluticasone (Flonase) 50 MCG/ACT nasal spray 2 sprays into the nostril(s) as directed by provider Daily. 9.9 mL 2   • insulin aspart (novoLOG FLEXPEN) 100 UNIT/ML solution pen-injector  sc pen Inject 30 Units under the skin into the appropriate area as directed Daily for 90 days. 27 mL 1   • meclizine (ANTIVERT) 25 MG tablet Take 1 tablet by mouth 3 (Three) Times a Day As Needed for Dizziness. 30 tablet 1   • pantoprazole (PROTONIX) 40 MG EC tablet Take 40 mg by mouth Daily.     • potassium chloride (K-DUR,KLOR-CON) 20 MEQ CR tablet Take 1 tablet by mouth 2 (Two) Times a Day. 180 tablet 1   • PREDNISONE PO Take 10 mg by mouth Daily.     • [DISCONTINUED] cephalexin (KEFLEX) 500 MG capsule        No current facility-administered medications on file prior to visit.       Assessment and Plan    Diagnoses and all orders for this visit:    1. Inpatient hospitalization within last 30 days (Primary)  -     Cancel: C-reactive protein  -     Cancel: C-reactive protein  -     C-reactive protein; Future    2. Bilious vomiting with nausea  Comments:  Will refer over to general surgery for cholecystitis, patient given strict diet until seen by general surgery and antinausea medicine  Orders:  -     Cancel: Comprehensive metabolic panel  -     Cancel: Comprehensive metabolic panel  -     Comprehensive metabolic panel; Future    3. Stage 3a chronic kidney disease (CMS/HCC)  Comments:  Will refer over to nephrology for further management  Orders:  -     Ambulatory Referral to Nephrology  -     Cancel: Comprehensive metabolic panel  -     Cancel: Comprehensive metabolic panel  -     Comprehensive metabolic panel; Future    4. Calculus of gallbladder without cholecystitis without obstruction  -     Ambulatory Referral to General Surgery    5. Pain in both lower extremities  Comments:  Chronic recurring issue we will do a low-dose of gabapentin with a 2-week follow-up.  Patient is agreeable treatment plan.  Orders:  -     gabapentin (NEURONTIN) 300 MG capsule; Take 1 capsule by mouth Every Night.  Dispense: 30 capsule; Refill: 1  -     traMADol (ULTRAM) 50 MG tablet; Take 1 tablet by mouth Every 6 (Six) Hours As  Needed for Moderate Pain  for up to 30 days.  Dispense: 60 tablet; Refill: 3    6. Long term systemic steroid user  Comments:  Due to long term use of steroids dermatology recommended adrenal work-up to rule out Cushing's  Orders:  -     Cancel: Cortisol  -     Cancel: ACTH, Plasma (3 Specimens)  -     Cancel: ACTH  -     ACTH; Future  -     Cortisol; Future    7. Anxiety  Comments:  Ongoing issue needing refills on Xanax.  Orders:  -     ALPRAZolam (XANAX) 0.25 MG tablet; Take 1 tablet by mouth 3 (Three) Times a Day As Needed for Anxiety for up to 30 days.  Dispense: 90 tablet; Refill: 0    Other orders  -     hydrOXYzine (ATARAX) 25 MG tablet; Take 1 tablet by mouth 3 (Three) Times a Day As Needed for Anxiety.  Dispense: 120 tablet; Refill: 3  -     Lancets (freestyle) lancets; Use BID  X 90 day  Dispense: 200 each; Refill: 1        Follow Up   No follow-ups on file.  Patient was given instructions and counseling regarding her condition or for health maintenance advice. Please see specific information pulled into the AVS if appropriate.

## 2021-10-06 ENCOUNTER — HOME HEALTH ADMISSION (OUTPATIENT)
Dept: HOME HEALTH SERVICES | Facility: HOME HEALTHCARE | Age: 66
End: 2021-10-06

## 2021-10-06 ENCOUNTER — LAB (OUTPATIENT)
Dept: LAB | Facility: HOSPITAL | Age: 66
End: 2021-10-06

## 2021-10-06 ENCOUNTER — OFFICE VISIT (OUTPATIENT)
Dept: FAMILY MEDICINE CLINIC | Facility: CLINIC | Age: 66
End: 2021-10-06

## 2021-10-06 VITALS
TEMPERATURE: 98.5 F | WEIGHT: 264 LBS | SYSTOLIC BLOOD PRESSURE: 93 MMHG | HEIGHT: 60 IN | OXYGEN SATURATION: 87 % | DIASTOLIC BLOOD PRESSURE: 76 MMHG | BODY MASS INDEX: 51.83 KG/M2 | HEART RATE: 104 BPM

## 2021-10-06 DIAGNOSIS — R63.0 ANOREXIA: ICD-10-CM

## 2021-10-06 DIAGNOSIS — R11.14 BILIOUS VOMITING WITH NAUSEA: ICD-10-CM

## 2021-10-06 DIAGNOSIS — Z09 FOLLOW UP: Primary | ICD-10-CM

## 2021-10-06 DIAGNOSIS — Z79.52 LONG TERM SYSTEMIC STEROID USER: ICD-10-CM

## 2021-10-06 DIAGNOSIS — Z78.9 INPATIENT HOSPITALIZATION WITHIN LAST 30 DAYS: ICD-10-CM

## 2021-10-06 DIAGNOSIS — R26.2 IMPAIRED AMBULATION: ICD-10-CM

## 2021-10-06 DIAGNOSIS — L03.119 CELLULITIS OF LOWER EXTREMITY, UNSPECIFIED LATERALITY: ICD-10-CM

## 2021-10-06 DIAGNOSIS — M62.81 MUSCLE WEAKNESS (GENERALIZED): ICD-10-CM

## 2021-10-06 DIAGNOSIS — N18.31 STAGE 3A CHRONIC KIDNEY DISEASE (HCC): ICD-10-CM

## 2021-10-06 LAB
ALBUMIN SERPL-MCNC: 2.9 G/DL (ref 3.5–5.2)
ALBUMIN/GLOB SERPL: 1.3 G/DL
ALP SERPL-CCNC: 161 U/L (ref 39–117)
ALT SERPL W P-5'-P-CCNC: 9 U/L (ref 1–33)
ANION GAP SERPL CALCULATED.3IONS-SCNC: 11.5 MMOL/L (ref 5–15)
AST SERPL-CCNC: 22 U/L (ref 1–32)
BILIRUB SERPL-MCNC: 0.5 MG/DL (ref 0–1.2)
BUN SERPL-MCNC: 18 MG/DL (ref 8–23)
BUN/CREAT SERPL: 9.5 (ref 7–25)
CALCIUM SPEC-SCNC: 8.9 MG/DL (ref 8.6–10.5)
CHLORIDE SERPL-SCNC: 96 MMOL/L (ref 98–107)
CO2 SERPL-SCNC: 31.5 MMOL/L (ref 22–29)
CORTIS SERPL-MCNC: 12.39 MCG/DL
CREAT SERPL-MCNC: 1.89 MG/DL (ref 0.57–1)
CRP SERPL-MCNC: 5.45 MG/DL (ref 0–0.5)
GFR SERPL CREATININE-BSD FRML MDRD: 27 ML/MIN/1.73
GLOBULIN UR ELPH-MCNC: 2.2 GM/DL
GLUCOSE SERPL-MCNC: 157 MG/DL (ref 65–99)
POTASSIUM SERPL-SCNC: 3.9 MMOL/L (ref 3.5–5.2)
PROT SERPL-MCNC: 5.1 G/DL (ref 6–8.5)
SODIUM SERPL-SCNC: 139 MMOL/L (ref 136–145)

## 2021-10-06 PROCEDURE — 99214 OFFICE O/P EST MOD 30 MIN: CPT | Performed by: NURSE PRACTITIONER

## 2021-10-06 PROCEDURE — 80053 COMPREHEN METABOLIC PANEL: CPT

## 2021-10-06 PROCEDURE — 87077 CULTURE AEROBIC IDENTIFY: CPT | Performed by: NURSE PRACTITIONER

## 2021-10-06 PROCEDURE — 36415 COLL VENOUS BLD VENIPUNCTURE: CPT

## 2021-10-06 PROCEDURE — 87070 CULTURE OTHR SPECIMN AEROBIC: CPT | Performed by: NURSE PRACTITIONER

## 2021-10-06 PROCEDURE — 82533 TOTAL CORTISOL: CPT

## 2021-10-06 PROCEDURE — 86140 C-REACTIVE PROTEIN: CPT

## 2021-10-06 PROCEDURE — 82024 ASSAY OF ACTH: CPT

## 2021-10-06 PROCEDURE — 87205 SMEAR GRAM STAIN: CPT | Performed by: NURSE PRACTITIONER

## 2021-10-06 PROCEDURE — 87186 SC STD MICRODIL/AGAR DIL: CPT | Performed by: NURSE PRACTITIONER

## 2021-10-06 RX ORDER — MIDODRINE HYDROCHLORIDE 5 MG/1
TABLET ORAL DAILY PRN
COMMUNITY
Start: 2021-09-15

## 2021-10-06 RX ORDER — CIPROFLOXACIN 500 MG/1
TABLET, FILM COATED ORAL DAILY PRN
COMMUNITY
Start: 2021-08-25

## 2021-10-06 RX ORDER — KETOCONAZOLE 20 MG/G
CREAM TOPICAL
COMMUNITY
Start: 2021-10-01

## 2021-10-06 RX ORDER — CEPHALEXIN 500 MG/1
500 CAPSULE ORAL 3 TIMES DAILY
Qty: 30 CAPSULE | Refills: 0 | Status: SHIPPED | OUTPATIENT
Start: 2021-10-06 | End: 2021-10-16

## 2021-10-06 NOTE — PROGRESS NOTES
Chief Complaint  Anorexia and Follow-up    Subjective        Social History     Socioeconomic History   • Marital status:      Spouse name: Not on file   • Number of children: Not on file   • Years of education: Not on file   • Highest education level: Not on file   Tobacco Use   • Smoking status: Never Smoker   • Smokeless tobacco: Never Used   Vaping Use   • Vaping Use: Never used   Substance and Sexual Activity   • Alcohol use: Not Currently   • Drug use: Never   • Sexual activity: Defer     Past Medical History:   Diagnosis Date   • Anxiety    • Broken bones     LEFT ANKLE   • Cellulitis    • CHF (congestive heart failure) ([facility])    • Diabetes ([facility])    • Hearing loss     WEARS HEARING AIDS   • Hypercalcemia 08/04/2020   • 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 The current episode started 1 to 4 weeks ago. The problem occurs daily. The problem has been waxing and waning. Associated symptoms include anorexia, fatigue and nausea. Pertinent negatives include no vomiting. The symptoms are aggravated by eating. Treatments tried: [place] diet/low-fat diet. The treatment provided mild relief.   Leg Swelling  This is a recurrent problem. The current episode started more than 1 year ago. The problem occurs daily. The problem has been gradually worsening. Associated symptoms include anorexia, fatigue and nausea. Pertinent negatives include no vomiting. Associated symptoms comments: Redness and swelling bilateral legs, left worse than right. Nothing aggravates the symptoms. Treatments tried: Diana boots, antibiotic therapy. Improvement on treatment: noncompliance with Diana boots.       Objective   Vital Signs:   BP 93/76   Pulse 104    "Temp 98.5 °F (36.9 °C)   Ht 152.4 cm (60\")   Wt 120 kg (264 lb)   SpO2 (!) 87%   BMI 51.56 kg/m²     Physical Exam  Vitals reviewed.   Constitutional:       General: She is not in acute distress.     Appearance: Normal appearance. She is well-developed. She is obese. She is ill-appearing (Chronically ill-appearing).   HENT:      Head: Normocephalic and atraumatic.      Right Ear: Decreased hearing noted.      Left Ear: Decreased hearing noted.      Mouth/Throat:      Pharynx: No oropharyngeal exudate.   Eyes:      Conjunctiva/sclera: Conjunctivae normal.      Pupils: Pupils are equal, round, and reactive to light.   Cardiovascular:      Rate and Rhythm: Normal rate and regular rhythm.      Heart sounds: No murmur heard.        Comments: With redness and warmth/cellulitic changes  Pulmonary:      Effort: Pulmonary effort is normal.      Breath sounds: Normal breath sounds. No wheezing or rhonchi.   Musculoskeletal:      Right lower leg: Edema present.      Left lower leg: Edema present.   Skin:     General: Skin is warm and dry.   Neurological:      Mental Status: She is alert and oriented to person, place, and time.      Cranial Nerves: No cranial nerve deficit.      Gait: Gait abnormal (wheelchair dependent).   Psychiatric:         Mood and Affect: Mood and affect normal.         Behavior: Behavior normal.         Thought Content: Thought content normal.         Judgment: Judgment normal.        Result Review :   The following data was reviewed by: ERIN Ly on 10/06/2021:  CMP    CMP 5/2/21 5/3/21 5/17/21   Glucose   325 (A)   BUN   21   Creatinine   1.13 (A)   Sodium   135   Potassium 4.1 4.6 3.6   Chloride   88 (A)   Calcium   9.7   Albumin   3.4 (A)   Total Bilirubin   0.16 (A)   Alkaline Phosphatase   167 (A)   AST (SGOT)   18   ALT (SGPT)   31   (A) Abnormal value       Comments are available for some flowsheets but are not being displayed.           CBC w/diff    CBC w/Diff 9/14/21 " 9/15/21 9/22/21   WBC 10.84 8.76 8.25   RBC 4.03 4.38 4.42   Hemoglobin 10.3 (A) 11.1 (A) 11.4 (A)   Hematocrit 33.9 (A) 38.3 36.6   MCV 84.1 87.4 82.8   MCH 25.6 (A) 25.3 (A) 25.8 (A)   MCHC 30.4 (A) 29.0 (A) 31.1   RDW 15.7 15.9 15.9   Platelets 377 361 486 (A)   Neutrophil Rel % 48.6 55.2 45.9   Immature Granulocyte Rel % 0.6 1.7 (A) 1.7 (A)   Lymphocyte Rel % 27.6 23.1 30.4   Monocyte Rel % 15.0 9.1 15.2 (A)   Eosinophil Rel % 7.6 (A) 9.2 (A) 5.7   Basophil Rel % 0.6 1.7 1.1   (A) Abnormal value                Most Recent A1C    HGBA1C Most Recent 9/6/21   Hemoglobin A1C 8.0 (A)   (A) Abnormal value       Comments are available for some flowsheets but are not being displayed.                         Assessment and Plan    Diagnoses and all orders for this visit:    1. Follow up (Primary)    2. Cellulitis of lower extremity, unspecified laterality  Comments:  Will start on Keflex 3 times daily max boots, will order home health  Orders:  -     Wound Culture - Wound, Leg, Left  -     Ambulatory Referral to Home Health    3. Anorexia  Comments:  Due to gallstone, patient has not had a referral completed we will do further investigation to get her an appointment with general surgery.,   Orders:  -     Ambulatory Referral to Home Health    4. Impaired ambulation  Comments:  Will order home health for PT /OT  Orders:  -     Ambulatory Referral to Home Health    5. Muscle weakness (generalized)  Comments:  Will order home health for PT /OT  Orders:  -     Ambulatory Referral to Home Health    Other orders  -     cephalexin (Keflex) 500 MG capsule; Take 1 capsule by mouth 3 (Three) Times a Day for 10 days.  Dispense: 30 capsule; Refill: 0        Follow Up   Return in about 8 weeks (around 12/1/2021).  Patient was given instructions and counseling regarding her condition or for health maintenance advice. Please see specific information pulled into the AVS if appropriate.

## 2021-10-07 LAB — ACTH PLAS-MCNC: 9.2 PG/ML (ref 7.2–63.3)

## 2021-10-10 LAB
BACTERIA SPEC AEROBE CULT: ABNORMAL
BACTERIA SPEC AEROBE CULT: ABNORMAL
GRAM STN SPEC: ABNORMAL

## 2021-10-14 ENCOUNTER — TELEPHONE (OUTPATIENT)
Dept: FAMILY MEDICINE CLINIC | Facility: CLINIC | Age: 66
End: 2021-10-14

## 2021-10-14 NOTE — TELEPHONE ENCOUNTER
CALL TRANSFERRED FROM [facility] FROM [facility] (ISIDRO) FROM DR. MARTIN OFFICE. SHE STATED SHE SPOKE WITH THE PT'S NIECE AND THAT THE PT IS IN THE HOSPITAL AT [facility] AND SHE DOESN'T NEED THE GALL STONES REMOVED BECAUSE THEY ARE IN A PLACE THAT IS NOT CONCERNING. ISIDRO SAID THAT SHE COULDN'T SCHEDULE THE PT BECAUSE OF THAT REASON AND JUST WANTED TO LET US KNOW.

## 2021-12-27 ENCOUNTER — APPOINTMENT (OUTPATIENT)
Dept: RESPIRATORY THERAPY | Facility: HOSPITAL | Age: 66
End: 2021-12-27